# Patient Record
Sex: FEMALE | Employment: OTHER | URBAN - METROPOLITAN AREA
[De-identification: names, ages, dates, MRNs, and addresses within clinical notes are randomized per-mention and may not be internally consistent; named-entity substitution may affect disease eponyms.]

---

## 2024-01-13 NOTE — PROGRESS NOTES
[facility]  [facility]  [facility] [place] [facility]  [address] [place] [address]     [facility]  New Visit Note      Subjective   Patient ID: Chirag Beltran is a 78 y.o. female with past medical history significant for Insomnia. Hypertension, and Sleep disorder breathing.    Patient is here {pxarrival:32131} and referred by [unfilled] for comprehensive sleep medicine evaluation review sleep study. No data recorded is, and   is  today.    Aug  8 2023 : This was at least 20 years ago. She still uses CPAP and tolerates it well. She uses a FFM. She denies issues with pressure. She doesn't use humidity. She is unsure of weight but believes her weight is down significantly from last visit and from when she had sleep study.     She has no issues falling asleep but can't stay asleep. She formerly worked in evenings and doesn't always wind down until later in the evening. She estimates she is currently getting 4 hours of sleep.     Previously she was on GBP and liquid doxepin for waking insomnia and this worked well. She also tried melatonin and hydroxizine which worked for years but stopped working. She also states trazodone worked for a while. She tolerated all of these medications well. She is not currently taking anything for sleep. (Jamison Kern PA-C)    HPI  {OSAhx:06944}      SLEEP STUDY HISTORY: (personally reviewed raw data such as interpretation report, data sheet, hypnogram, and titration table if available and applicable)  CHIRAG has had a sleep study in the past. The record is not available in clinic today.     SLEEP-WAKE SCHEDULE  Weekdays/Work/School Days: usual bed time: 10 p,~usual wake time: 3:30 a~and~falls asleep at about: 10:15 p.   Weekends/Off Work/Vacation Days: same as above schedule.   Naps: Patient Does Not Nap.   SLEEP INITIATION: No problems going to sleep.   SLEEP MAINTENANCE: prolonged  "awakenings~and~wakes up about one time a night.   BREATHING DURING SLEEP: No gasping/choking for air.   Sleep Positioning: supine~and~side.   MORNING SYMPTOMS: No morning symptoms, patient denies morning dry mouth, sore throat or headaches.   DAYTIME: Daytime sleepiness. ~Fatigue. ~Trouble remembering things in daytime. ~Trouble staying focused in daytime. ~Irritable during the day. ~No drowsy driving.   HYPERSOMNIA: No sleep paralysis.~No sleep related hallucinations.   LEGS AT NIGHT: No symptoms of restless legs syndrome (RLS).   MOVEMENTS IN SLEEP: No problematic movements in sleep.   PARASOMNIA: No symptoms of problem parasomnias.     SLEEP ENVIRONMENT  Sleep location: ***  Sleep status: {sleep status:86428}  Room is dark:  {Yes/No:11141::\"Yes\"}  Room is quiet: {Yes/No:64632::\"Yes\"}  Room is cool: {Yes/No:36902::\"Yes\"}  Bed comfort: good    SLEEP HABITS:   Activities before bedtime: ***  Activities in bed: ***  Preferred sleep position: {Sleep position:98908}    SLEEP ROS:  Night symptoms: {sleep apnea ROS at night:87856}  Morning symptoms: {sleep apnea ROS in mornin}  Daytime symptoms {sleep apnea ROS at daytime:84052}  Hypersomnia / narcolepsy symptoms: {narcolepsy ROS:17431}  RLS symptoms: {RLS symptoms:68216}  Movements in sleep: {sleep movements:95841}  Parasomnia symptoms: {parasomnia ROS:91970}    WEIGHT: {weight:28073}    REVIEW OF SYSTEMS: All other systems have been reviewed and are negative.    PERTINENT SOCIAL HISTORY:  Occupation: employment status:53506}  Smoking: {Yes/No:83155}  ETOH: {Yes/No:20036}  Marijuana: {Yes/No:77728}  Caffeine: ***  Sleep aids: {Yes/No:82901}  Claustrophobia: {Yes/No:88361}    PERTINENT PAST SURGICAL HISTORY:  {surgical history pertinent in sleep:59091}    PERTINENT FAMILY HISTORY:  {family history in sleep:30988}    Active Problems, Allergy List, Medication List, and PMH/PSH/FH/Social Hx have been reviewed and reconciled in chart. No significant changes unless " documented in the pertinent chart section. Updates made when necessary.       Objective     Physical Exam  Constitutional:alert and oriented to time, place, and person  Sinus: *** tenderness to palpation  Palate: Normal / Narrow / High-arched / *** torus palatini  Mallampati ***, *** Tongue scalloping, *** macroglossia  Lungs: Clear to auscultation bilateral, no rales  Heart: Regular rate and rhythm, no murmurs    Assessment/Plan   Chirag Betlran is a 78 y.o. female who is seen to evaluate for  obstructive sleep apnea. The pathophysiology of sleep apnea, diagnostic testing (HST vs PSG), treatment options (PAP, oral appliance, surgery, hypoglossal nerve stimulator called Inspire), and supportive management (weight loss, positional therapy, smoking cessation, avoidance of alcohol and sedatives) were discussed with the patient in detail. Risk factors of sleep apnea as well as cardiometabolic and neurocognitive sequelae associated with untreated sleep apnea were also discussed. Lastly, patient was advised to avoid driving vehicle or operating heavy machinery when sleepy.      #CHRONIC SLEEP ONSET AND SLEEP MAINTENANCE INSOMNIA  -Start liquid doxepin as needed for middle of the night waking  -Get out of bed if unable to fall back to sleep in 15-20 minutes  -She doesn't nap, no caffeine later in day  -Possibly secondary to insufficiently treated JJ  -Will re-evaluate after sleep study     #OBSTRUCTIVE SLEEP APNEA  -Start/ Continue [] cmH20 []PAP through [employer].  -May benefit from non-full-face mask  -Goal is stopping waking at night, less daytime sleepiness, better BP  -Sleep apnea and PAP therapy education were provided at length in the clinic today. Chirag Beltran verbalized understanding.  -Emphasized diet, exercise, and weight loss in the clinic, as were non-supine sleep, avoiding alcohol in the late evening, and driving or operating heavy machinery when sleepy.  Chirag  Gaston-Nadegeverbalizes understanding of the above instructions and risks.     #HYPERTENSION  -No recent BP measurements     Followup 2-3 weeks after sleep test to review results     RTC      All of patient's questions were answered. She verbalizes understanding and agreement with my assessment and plan.

## 2024-01-17 ENCOUNTER — APPOINTMENT (OUTPATIENT)
Dept: SLEEP MEDICINE | Facility: CLINIC | Age: 79
End: 2024-01-17
Payer: MEDICARE

## 2024-01-23 ENCOUNTER — APPOINTMENT (OUTPATIENT)
Dept: PRIMARY CARE | Facility: CLINIC | Age: 79
End: 2024-01-23
Payer: MEDICARE

## 2024-02-22 NOTE — PROGRESS NOTES
[facility]  [facility]  [address]  [facility] [id number] [phone]     [facility]  New Visit Note      Subjective   Patient ID: Chirag Beltran is a 78 y.o. female with past medical history significant for Hypertension, HLD, diabetes,  insomnia, and Obstructive sleep apnea.    2/26/24: The patient is here alone today and was referred by herself for comprehensive sleep medicine evaluation due to waking up early difficulty staying asleep, and sleep apnea. She mentioned she did complete the repeated sleep study in 2023, and the sleep lab never called her to make an appointment. She stated that she only took 10 mg of Doxepin to help her sleep. Her ESS is 6, and her LUCINDA is 22  today. She mentioned that she is stressed out because her son had a seizure recently, and she is unable to rest well. She has been using the CPAP for ten years but is unable to recall the setting and brand. She had two times episodes of asthma attacks previously, which made her very distressed. Today, she came to the office to establish care to get a new machine and appropriate treatment.      8/8/23: Ms. CHIRAG BELTRAN presents for sleep medicine consultation referred by self due to early AM waking and JJ. CHIRAG has had a sleep study in the past. The record is not available in clinic today. This was at least 20 years ago. She still uses CPAP and tolerates it well. She uses a FFM. She denies issues with pressure. She doesn't use humidity. She is unsure of weight but believes her weight is down significantly from last visit and from when she had sleep study. She has no issues falling asleep but can't stay asleep. She formerly worked in evenings and doesn't always wind down until later in the evening. She estimates she is currently getting 4 hours of sleep. Previously she was on GBP and liquid doxepin for waking  insomnia and this worked well. She also tried melatonin and hydroxizine which worked for years but stopped working. She also states trazodone worked for a while. She tolerated all of these medications well. She is not currently taking anything for sleep. ( By Jamison Kern P.A.-C)     HPI  Patient had been having these symptoms for the past 30 years.   Patient had sleep study done in the past but no available records.       SLEEP STUDY HISTORY: (personally reviewed raw data such as interpretation report, data sheet, hypnogram, and titration table if available and applicable)  7/9/2013: [facility]: PSG: AHI :5.9/hr, [place]: 88%, ETCO2: 45 mmhg  9/4/2013: [facility]: Titration: suggest 7 CWP, if the not respond should be increased to 9 CWP.    SLEEP-WAKE SCHEDULE  Weekdays/Work/School Days: usual bed time: 1 AM,~usual wake time: 6 AM  Weekends/Off Work/Vacation Days: same as above schedule.   Naps: sometimes. Not refresh  SLEEP INITIATION: 30-60 minutes   SLEEP MAINTENANCE: wakes up about one time a night.   BREATHING DURING SLEEP: No gasping/choking for air.   Sleep Positioning: supine~and~side.   Average sleep duration (excluding naps): 4-5 hours    SLEEP ENVIRONMENT  Sleep location: bed  Sleep status: sleeps alone  Room is dark:  Yes  Room is quiet: Yes  Room is cool: Yes  Bed comfort: good    SLEEP HABITS:   Activities before bedtime: play game  Activities in bed: play game  Preferred sleep position: side    SLEEP ROS:  NIGHT SYMPTOM: Hard to fall asleep, snoring.  MORNING SYMPTOMS: The patient has morning dry mouth  DAYTIME: Daytime sleepiness. ~Fatigue. ~Trouble remembering things in daytime. ~Trouble staying focused in daytime. ~Irritable during the day. ~No drowsy driving.   HYPERSOMNIA: No sleep paralysis.~No sleep related hallucinations.   LEGS AT NIGHT: No symptoms of restless legs syndrome (RLS).   MOVEMENTS IN SLEEP: No problematic movements in sleep.   PARASOMNIA: No symptoms of problem  parasomnias.     WEIGHT: stable    REVIEW OF SYSTEMS: All other systems have been reviewed and are negative.    PERTINENT SOCIAL HISTORY:  Occupation: retired RN   Smoking: No   ETOH: No   Marijuana: No   Caffeine: No  Sleep aids: Yes   Claustrophobia: No     PERTINENT PAST SURGICAL HISTORY:  tonsillectomy    PERTINENT FAMILY HISTORY:  Patient denies family history of any sleep disorder.    Active Problems, Allergy List, Medication List, and PMH/PSH/FH/Social Hx have been reviewed and reconciled in chart. No significant changes unless documented in the pertinent chart section. Updates made when necessary.     Objective     Vitals:    02/26/24 1017   BP: 116/72   Pulse: 90   Temp: 36.9 °C (98.5 °F)   SpO2: 96%      Physical Exam  Constitutional:alert and oriented to time, place, and person  Sinus: - tenderness to palpation  Palate: Narrow Mallampati 2, - Tongue scalloping, - macroglossia  Lungs: Clear to auscultation bilateral, no rales  Heart: Regular rate and rhythm, no murmurs    PAP Adherence:  DURABLE [employer]QU[employer]MENT COMPANY: unknown  Machine: THERAPY: unknown    Mask: MASK TYPE: ESON nasal, NO SUPPLIES SINCE 2019          Issues with therapy: ISSUES WITH THERAPY: unknown  Benefits with PAP: unknown    Assessment/Plan   Mareksatinder Ruby is a 78 y.o. female who is seen to evaluate for mild obstructive sleep apnea. The pathophysiology of sleep apnea, diagnostic testing (HST vs PSG), treatment options (PAP, oral appliance, surgery, hypoglossal nerve stimulator called Inspire), and supportive management (weight loss, positional therapy, smoking cessation, avoidance of alcohol and sedatives) were discussed with the patient in detail. Risk factors of sleep apnea as well as cardiometabolic and neurocognitive sequelae associated with untreated sleep apnea were also discussed. Lastly, patient was advised to avoid driving vehicle or operating heavy machinery when sleepy.     Chirag Beltran with the  following problems:     # OBSTRUCTIVE SLEEP APNEA  -Will order in-lab test to re-evaluate JJ and optimal CPAP settings.  -May benefit from non-full-face mask, not sure the current setting.  -Emphasized diet, exercise, and weight loss in the clinic, as were non-supine sleep, avoiding alcohol in the late evening, and driving or operating heavy machinery when sleepy.  -Chirag Feldman-McCombverbalizes understanding of the above instructions and risks.    #CHRONIC SLEEP ONSET AND SLEEP MAINTENANCE INSOMNIA  -Continue liquid doxepin as needed for middle of the night waking  -Get out of bed if unable to fall back to sleep in 15-20 minutes  -She doesn't nap, no caffeine later in day  -Possibly secondary to insufficiently treated JJ  -Will re-evaluate after sleep study     # DEPRESSION and ANXIETY:  -Refer to see a psychiatric  -Denies HI/SI    #HYPERTENSION  -BP was controlled in the clinic.  -F/U with PCP.  -Denies headache/palpitation and syncope in the clinic.       RTC 2-3 weeks after sleep study      All of patient's questions were answered. She verbalizes understanding and agreement with my assessment and plan.

## 2024-02-22 NOTE — PATIENT INSTRUCTIONS
[facility]  [facility]  [address]  [facility] [address] [phone]       Thank you for coming to the Sleep Medicine Clinic today! Your sleep medicine provider today was: JAMIE Ley Below is a summary of your treatment plan, patient education, other important information, and our contact numbers.    Dear Chirag Beltran,    Thank you for visiting  Sleep Medicine Clinic !     1. We will proceed with a SPLIT NIGHT sleep study. The lab will call you and schedule it for you.     2. Please do not drive when you are sleepy and  start practicing the sleep hygiene  as discussed in clinic today.     3. I am referring you to see a psychiatrist, please make an appointment to follow-up.    4.  FOR QUESTIONS AND CONCERNS:   a) : To schedule, cancel, or reschedule SLEEP STUDY appointments, please call [phone]  b): Please call my office with issues or questions: [phone] ([place]); [phone] ([facility])    If you have a CPAP or BiPAP machine at home, please bring the unit and all accessories including the power cord to your appointments unless I tell you otherwise. Please have knowledge of the DME company you worked with to receive your PAP device. If you have copies of any previous sleep testing completed outside of , please bring with you to clinic as well. This information will make our visits more productive.     If you are new to CPAP or BiPAP, please note the minimum usage insurance requires to continuing coverage for the equipment as noted by your DME company. Please discuss equipment issues (PAP unit, mask fit, humidification, etc.) with your DME company first.       In the event that you are running more than 10 minutes late to your appointment, I will kindly ask you to reschedule. Thanks.      TREATMENT PLAN       Referrals:  We are referring you the the following:  Psychiatry    Follow-up  Appointment:   Follow-up in 2-3 weeks after sleep study.    PATIENT EDUCATION     Obstructive Sleep Apnea (JJ) is a sleep disorder where your upper airway muscles relax during sleep and the airway intermittently and repetitively narrows and collapses leading to partially blocked airway (hypopnea) or completely blocked airway (apnea) which, in turn, can disrupt breathing in sleep, lower oxygen levels while you sleep and cause night time wakings. Because both apnea and hypopnea may cause higher carbon dioxide or low oxygen levels, untreated JJ can lead to heart arrhythmia, elevation of blood pressure, and make it harder for the body to consolidate memory and facilitate metabolism (leading to higher blood sugars at night). Frequent partial arousals occur during sleep resulting in sleep deprivation and daytime sleepiness. JJ is associated with an increased risk of cardiovascular disease, stroke, hypertension, and insulin resistance. Moreover, untreated JJ with excessive daytime sleepiness can increase the risk of motor vehicular accidents.    Some conservative strategies for JJ regardless of JJ severity are:   Positional therapy - Avoid sleeping on your back.   Healthy diet and regular exercise to optimize weight is highly encouraged.   Avoid alcohol late in the evening and sedative-hypnotics as these substances can make sleep apnea worse.   Improve breathing through the nose with intranasal steroid spray, saline rinse, or antihistamines    Safety: Avoid driving vehicle and operating heavy equipment while sleepy. Drowsy driving may lead to life-threatening motor vehicle accidents. A person driving while sleepy is 5 times more likely to have an accident. If you feel sleepy, pull over and take a short power nap (sleep for less than 30 minutes). Otherwise, ask somebody to drive you.    Treatment options for sleep apnea include weight management, positional therapy, Positive Airway Therapy (PAP) therapy, oral  appliance therapy, hypoglossal nerve stimulator (Inspire) and select airway surgeries.    Sleep Testing for sleep apnea: The best and ideal way to check out if you have sleep apnea is to do an overnight sleep study in the sleep laboratory. Alternatively, a home sleep apnea test can also be done depending on your insurance and risk factors.     If you are having a home sleep apnea test, kindly allot 1 hour during pickup of the testing kit as you will have to complete paperwork and listen to the sleep technician for in-person on-the-spot demonstration and instructions on how to hook up the testing kit at home. Do the test for 1 day and start off with sleeping on your back. If you sleep on your side in the middle of night or you have always been a side or stomach-sleeper, it is ok as long as you have some time on your back and off-back.     If you are having an overnight in-lab sleep study, please make sure to bring toiletries, a comfy pillow, additional warm blankets, and any nighttime medications (include as-needed inhaler, pain pill, etc) that you may regularly take. Also, be sure to eat dinner before you arrive as we generally do not provide meals inside the sleep testing center. Lastly, in order to fall asleep faster in the sleep testing center, we advise patients to wake up 2 hours earlier on the morning of scheduled testing and avoid napping 2 days prior testing. Sometimes, your sleep provider may prescribe a sleep aid to be taken at lights out in the sleep testing center. If you are taking a sleep aid, consider having somebody pick you up after the sleep testing.    Overnight sleep studies may be scheduled on a weekday or weekend. We also perform daytime testing for shift workers on a case-by-case basis.    Once you have booked an appointment to do the sleep study, please contact my office for follow-up visit to discuss results.    On the other hand, if you have any of the following, please consider calling the  sleep testing center to RESCHEDULE your sleep study appointment:  If you tested positive for COVID within 10 days of your sleep study appointment.  If you were exposed to somebody who was confirmed for COVID within 10 days of your sleep study appointment and now you are having symptoms of possible COVID  If you have fever>100F or any acute symptoms that you think will lead to poor sleep during testing (e.g. new or worsening stuffy nose not relieved by steroid nasal spray)  If you have traveled domestically or internationally in the last month and now you are having symptoms of possible COVID    Insomnia, or trouble falling asleep or staying asleep, can be caused by many different things such as untreated sleep apnea, anxiety, depression, stress, poor sleep habits, and other medical conditions or medications. The best way to treat insomnia is to treat the cause. In general, we can all benefit from better sleep hygiene (aka good sleep habits). Some recommendations to help you improve your sleep so you can fall asleep, stay asleep, and wake up feeling refreshed include:    Keep a routine bedtime and rise time even on weekends and non-work days. This helps your biological clock stay in sync with your sleep needs. If you currently have variable sleep-wake schedule, start off by waking up and getting out of bed the same time every day, even on weekends or non-work days. Whether you have good or poor sleep, waking up at the same time every day can help re-train and reset your body clock.  Go to bed when you are sleepy and not when tired nor before your goal bedtime. Long periods of time in bed will lead to fragmented, shallow, broken sleep.   Use the bed for sleep and intimacy only. Do not watch TV, eat, read or use phone/laptop in bed. Keeping sleep as the only activity in bed will help re-associate bed equals sleep.  Get up when you cannot sleep in 20-30 minutes. When you are unable to sleep, exit the bed and go to  "another room or chair in bedroom, do something boring, calm, relaxing, distracting, or non-stimulating in dim lighting until you feel sleepy enough to fall back asleep. Avoid stimulating activity or any triggers for mental thinking (e.g. cellphone). Repeat as needed.  Avoid napping during the day to build up sleep pressure so that it won't be hard for you to fall asleep at night. Napping, particularly in the late afternoon or early evening may interfere with your night's sleep. If naps are necessary, limit naps under 15-20 minutes and not later than 3 PM.   Create a \"buffer zone\" or \"wind-down time\". This is a quiet time prior to bedtime, typically at least 30 minutes and perhaps as long as 1-2 hours. During this time, you should do things that are enjoyable, relaxing, and not necessarily goal-oriented like performing relaxation exercises, listening to relaxing music, reading a boring book, dimming the lights, or eating a light bedtime snack like a glass of milk and banana which can promote sleep. Activities that promote relaxation before sleep is important because these can hep quiet the mind and relax the body to get into the right states for sleep.   Do not worry or plan in bed. If you are worrying, planning, feeling anxious, or cannot shut off your thoughts, get up and stay out of bed until you have quieted your mind. Set up a “scheduled worry time” in the morning or late afternoon to write down your worries and stressors as well as plans for the following day.   Keep your bedroom quiet, dark and cool. Ideal sleeping temperature is 65F. If light bothers you, get a slumber mask or blackout shades. If noise bothers you, put ear plugs or get a white noise machine. Alternatively, you may turn on fan or humidifier to create a constant background noise to eliminate unexpected sounds that would otherwise wake you up in the middle of your sleep.  Keep your bedroom technology free. Avoid TV and electronics 1-2 hours " prior to bedtime. Also, turn the clock around to avoid clock-watching. Thoughts of the time can cause frustration and make it more difficult to go to sleep.   DO NOT TRY TOO HARD TO SLEEP. JUST ALLOW SLEEP TO UNFOLD.  Other things to avoid to help   Avoid  caffeine (including chocolate) intake in the late afternoon and early evening. Limit the amount of caffeine and consume before noon.   Avoid smoking 2-3 hours before bedtime. Like caffeine, nicotine in cigarette smoking acts a stimulant.   Avoid alcohol intake 2-3 hours before bedtime. Although alcohol may seem to help you fall asleep more easily as it slows down brain activity, it also disrupts your sleep during the night by causing frequent awakenings as the effect of alcohol wears off. Also, alcohol worsens snoring and sleep apnea  Avoid eating a heavy meal (high-fat, high-carbohydrate, gas-producing foods) at dinner or 2-3 hours before bedtime.    Avoid drinking more than 8 oz liquids in the evening. Restrict fluids after dinner and void at bedtime.  Avoid physical exercise or hot shower / warm bath within 2 hours of bedtime. Regular exercise can improve sleep quality, but exercising or having a warm bath too close to bedtime can disrupt sleep onset. The best time to exercise to help sleep is in the late afternoon or early evening but not within 2 hours of bedtime. Warm baths or hot showers can be taken in the evening but not within 2 hours of going to bed.   Avoid sleeping with pets or kids if they appear to bother your sleep and/or sleep quality.    MOST IMPORTANTLY:  BE PATIENT!  BE CONSISTENT!  Your sleep problem developed over time so it will take some time and consistency to return to a more normal sleep pattern. By following the suggestions listed above, you should see gradual sleep improvements over time.    Also you have been recommended to do Cognitive Behavioral Therapy for Insomnia (CBT-I). CBT-I is widely recognized as an effective treatment for a  wide range of insomnias. CBT-I is typically made up of a number of components including assessment, behavioral and cognitive interventions, motivational techniques, and relapse prevention skills. An overwhelming amount of evidence suggests that CBT-I is as effective as hypnotics and the newer non-benzodiazepine sleep aides in the acute treatment and is more effective than medication in the long term treatment of insomnia.     Below are some resources for CBT-I:  CBT-I at  with Kori Tamassee, NP  GoToSleep- online course via [facility]. Self-guided. One time charge to sign up.  SleepEZ- Free self-guided course from the [facility] https://www.veterantraining.va.gov/insomnia/  Dr. Jean-Baptiste - one on one CBT-I service [email or web address]    You can also go to the following EDUCATION WEBSITES for further information:   American Academy of Sleep Medicine http://sleepeducation.org  National Sleep Foundation: https://sleepfoundation.org  American Sleep Apnea Association: https://www.sleepapnea.org (for patients with sleep apnea)  Narcolepsy Network: https://www.narcolepsynetwork.org (for patients with narcolepsy)  [employer]ole[employer]: https://www.wakeupnarcolepsy.org (for patients with narcolepsy)  Hypersomnia Foundation: https://www.hypersomniafoundation.org (for patients with idiopathic hypersomnia)  RLS foundation: https://www.rls.org (for patients with restless leg syndrome)    IMPORTANT INFORMATION     Call 911 for medical emergencies.  Our offices are generally open from Monday-Friday, 8 am - 5 pm.   There are no supporting services by either the sleep doctors or their staff on weekends and Holidays, or after 5 PM on weekdays.   If you need to get in touch with me, you may either call my office number or you can use iSentium.  If a referral for a test, for CPAP, or for another specialist was made, and you have not heard about scheduling this within a week, please call scheduling at [phone] ([phone]).  If you are unable to make  your appointment for clinic or an overnight study, kindly call the office or sleep testing center at least 48 hours in advance to cancel and reschedule.  If you are on CPAP, please bring your device's card and/or the device to each clinic appointment.   In case of problems with PAP machine or mask interface, please contact your DME (Durable Medical Equipment) company first. DME is the company who provides you the machine and/or PAP supplies.       PRESCRIPTIONS     We require 7 days advanced notice for prescription refills. If we do not receive the request in this time, we cannot guarantee that your medication will be refilled in time.    IMPORTANT PHONE NUMBERS     Sleep Medicine Clinic Fax: [phone]  Appointments (for Pediatric Sleep Clinic): [phone] - option 1  Appointments (for Adult Sleep Clinic): [phone] - option 2  Appointments (For Sleep Studies): [phone] - option 3  Behavioral Sleep Medicine: [phone]  Sleep Surgery: [phone]  Nutrition Service: [phone]  Weight management clinics with endocrinology: [phone]  Bariatric Services: [phone] (includes weight loss medications and weight loss surgery)  [facility]: [phone] (offers holistic approaches to weight management)  ENT (Otolaryngology): [phone]  Headache Clinic (Neurology): [phone]  Neurology: [phone]  Psychiatry: [phone]  Pulmonary Function Testing (PFT) Center: [phone]  Pulmonary Medicine: [phone]  Medical Service Company (DME): [phone]      OUR SLEEP TESTING LOCATIONS     Our team will contact you to schedule your sleep study, however, you can contact us as follow:  Main Phone Line (scheduling only): [phone] ([phone]), option 3  Adult and Pediatric Locations  [facility] (6 years and older): [facility] - 4th floor ([address] [place] [address]) After hours line: [phone]  [facility] at  "[facility] (Main campus: All ages): [facility], 6th floor. After hours line: [phone]   [place] (5 years and older; younger considered on case-by-case basis): [address]; [address], [address]. Scheduling  After hours line: [phone]   [place] (6 years and older): [address]; [address]; [address]   [place] (6 years and older): [address], [address]  After hours line: [phone]   [place] (13 years and older) in [place]: [address], 2nd floor  After hours line: [phone]   [place] (13 year and older): [address]  After hours line: [phone]     Adult Only Locations:   [place] (18 years and older): [address] [facility], 2nd floor   [place] (18 years and older): [address] [facility]; 4th floor  After hours line: [phone]   [place] (18 years and older) at [place]: [address] [facility]  After hours line: [phone]     CONTACTING YOUR SLEEP MEDICINE PROVIDER AND SLEEP TEAM      For issues with your machine or mask interface, please call your DME provider first. DME stands for durable medical company. DME is the company who provides you the machine and/or PAP supplies / accessories.   To schedule, cancel, or reschedule SLEEP STUDY APPOINTMENTS, please call the Main Phone Line at [phone] - option 3.   To schedule, cancel, or reschedule CLINIC APPOINTMENTS, you can do it in \"MyChart\", call [phone] to speak with my  (Bessie Peters), or call the Main Phone Line at [phone] - option 2  For CLINICAL QUESTIONS or MEDICATION REFILLS, please call direct line for Adult Sleep Nurses at [phone].   Lastly, you can also send a message directly to your provider through \"My Chart\", which is the email service through your  Records Account: [email or web address]       Here at [facility], we wish you a restful sleep!   "

## 2024-02-26 ENCOUNTER — OFFICE VISIT (OUTPATIENT)
Dept: SLEEP MEDICINE | Facility: HOSPITAL | Age: 79
End: 2024-02-26
Payer: MEDICARE

## 2024-02-26 VITALS
HEART RATE: 90 BPM | SYSTOLIC BLOOD PRESSURE: 116 MMHG | TEMPERATURE: 98.5 F | OXYGEN SATURATION: 96 % | WEIGHT: 135 LBS | DIASTOLIC BLOOD PRESSURE: 72 MMHG

## 2024-02-26 DIAGNOSIS — F32.A ANXIETY AND DEPRESSION: ICD-10-CM

## 2024-02-26 DIAGNOSIS — G47.00 INSOMNIA, UNSPECIFIED TYPE: ICD-10-CM

## 2024-02-26 DIAGNOSIS — I10 HYPERTENSION, UNSPECIFIED TYPE: ICD-10-CM

## 2024-02-26 DIAGNOSIS — G47.33 OSA (OBSTRUCTIVE SLEEP APNEA): Primary | ICD-10-CM

## 2024-02-26 DIAGNOSIS — F41.9 ANXIETY AND DEPRESSION: ICD-10-CM

## 2024-02-26 PROCEDURE — 3078F DIAST BP <80 MM HG: CPT

## 2024-02-26 PROCEDURE — 99203 OFFICE O/P NEW LOW 30 MIN: CPT

## 2024-02-26 PROCEDURE — 1036F TOBACCO NON-USER: CPT

## 2024-02-26 PROCEDURE — 3074F SYST BP LT 130 MM HG: CPT

## 2024-02-26 PROCEDURE — 1126F AMNT PAIN NOTED NONE PRSNT: CPT

## 2024-02-26 PROCEDURE — 1159F MED LIST DOCD IN RCRD: CPT

## 2024-02-26 PROCEDURE — 99213 OFFICE O/P EST LOW 20 MIN: CPT

## 2024-02-26 RX ORDER — DOXEPIN HYDROCHLORIDE 10 MG/ML
10 SOLUTION ORAL NIGHTLY
COMMUNITY
Start: 2018-08-27 | End: 2024-04-03 | Stop reason: DRUGHIGH

## 2024-02-26 RX ORDER — OMEGA-3-ACID ETHYL ESTERS 1 G/1
200 CAPSULE, LIQUID FILLED ORAL DAILY
COMMUNITY

## 2024-02-26 RX ORDER — ASCORBIC ACID 500 MG
500 TABLET ORAL DAILY
COMMUNITY
End: 2024-06-11 | Stop reason: WASHOUT

## 2024-02-26 RX ORDER — BISMUTH SUBSALICYLATE 262 MG
1 TABLET,CHEWABLE ORAL DAILY
COMMUNITY

## 2024-02-26 RX ORDER — ZINC GLUCONATE 50 MG
TABLET ORAL DAILY
COMMUNITY
End: 2024-06-11 | Stop reason: WASHOUT

## 2024-02-26 RX ORDER — ASPIRIN 81 MG/1
81 TABLET ORAL DAILY
COMMUNITY

## 2024-02-26 RX ORDER — FERROUS SULFATE, DRIED 160(50) MG
1 TABLET, EXTENDED RELEASE ORAL DAILY
COMMUNITY

## 2024-02-26 ASSESSMENT — SLEEP AND FATIGUE QUESTIONNAIRES
HOW LIKELY ARE YOU TO NOD OFF OR FALL ASLEEP WHILE LYING DOWN TO REST IN THE AFTERNOON WHEN CIRCUMSTANCES PERMIT: HIGH CHANCE OF DOZING
HOW LIKELY ARE YOU TO NOD OFF OR FALL ASLEEP WHEN YOU ARE A PASSENGER IN A CAR FOR AN HOUR WITHOUT A BREAK: WOULD NEVER DOZE
HOW LIKELY ARE YOU TO NOD OFF OR FALL ASLEEP WHILE WATCHING TV: HIGH CHANCE OF DOZING
DIFFICULTY_STAYING_ASLEEP: VERY SEVERE
WAKING_TOO_EARLY: VERY SEVERE
ESS-CHAD TOTAL SCORE: 6
SITING INACTIVE IN A PUBLIC PLACE LIKE A CLASS ROOM OR A MOVIE THEATER: WOULD NEVER DOZE
WORRIED_DISTRESSED_DUE_TO_SLEEP: VERY MUCH NOTICEABLE
HOW LIKELY ARE YOU TO NOD OFF OR FALL ASLEEP IN A CAR, WHILE STOPPED FOR A FEW MINUTES IN TRAFFIC: WOULD NEVER DOZE
SLEEP_PROBLEM_NOTICEABLE_TO_OTHERS: SOMEWHAT
HOW LIKELY ARE YOU TO NOD OFF OR FALL ASLEEP WHILE SITTING AND READING: WOULD NEVER DOZE
HOW LIKELY ARE YOU TO NOD OFF OR FALL ASLEEP WHILE SITTING QUIETLY AFTER LUNCH WITHOUT ALCOHOL: WOULD NEVER DOZE
SATISFACTION_WITH_CURRENT_SLEEP_PATTERN: VERY DISSATISFIED
DIFFICULTY_FALLING_ASLEEP: MODERATE
SLEEP_PROBLEM_INTERFERES_DAILY_ACTIVITIES: SOMEWHAT
HOW LIKELY ARE YOU TO NOD OFF OR FALL ASLEEP WHILE SITTING AND TALKING TO SOMEONE: WOULD NEVER DOZE

## 2024-02-26 ASSESSMENT — PAIN SCALES - GENERAL: PAINLEVEL: 0-NO PAIN

## 2024-03-26 ENCOUNTER — TELEPHONE (OUTPATIENT)
Dept: SLEEP MEDICINE | Facility: CLINIC | Age: 79
End: 2024-03-26
Payer: MEDICARE

## 2024-03-26 DIAGNOSIS — G47.33 OSA (OBSTRUCTIVE SLEEP APNEA): Primary | ICD-10-CM

## 2024-03-26 NOTE — TELEPHONE ENCOUNTER
Ps she wants an in home sleep study, pls change the order.      The patient called and wanted to change her study to Home Sleep Study.  The new Home Sleep Study order was placed.

## 2024-03-27 ENCOUNTER — APPOINTMENT (OUTPATIENT)
Dept: PRIMARY CARE | Facility: CLINIC | Age: 79
End: 2024-03-27
Payer: MEDICARE

## 2024-04-03 ENCOUNTER — OFFICE VISIT (OUTPATIENT)
Dept: BEHAVIORAL HEALTH | Facility: CLINIC | Age: 79
End: 2024-04-03
Payer: MEDICARE

## 2024-04-03 VITALS
HEART RATE: 79 BPM | SYSTOLIC BLOOD PRESSURE: 131 MMHG | WEIGHT: 138.9 LBS | TEMPERATURE: 97.6 F | HEIGHT: 62 IN | BODY MASS INDEX: 25.56 KG/M2 | DIASTOLIC BLOOD PRESSURE: 79 MMHG

## 2024-04-03 DIAGNOSIS — G47.00 INSOMNIA, UNSPECIFIED TYPE: ICD-10-CM

## 2024-04-03 DIAGNOSIS — F41.9 ANXIETY: ICD-10-CM

## 2024-04-03 PROCEDURE — 3075F SYST BP GE 130 - 139MM HG: CPT | Performed by: PSYCHIATRY & NEUROLOGY

## 2024-04-03 PROCEDURE — 1160F RVW MEDS BY RX/DR IN RCRD: CPT | Performed by: PSYCHIATRY & NEUROLOGY

## 2024-04-03 PROCEDURE — 3078F DIAST BP <80 MM HG: CPT | Performed by: PSYCHIATRY & NEUROLOGY

## 2024-04-03 PROCEDURE — 1159F MED LIST DOCD IN RCRD: CPT | Performed by: PSYCHIATRY & NEUROLOGY

## 2024-04-03 PROCEDURE — 90792 PSYCH DIAG EVAL W/MED SRVCS: CPT | Performed by: PSYCHIATRY & NEUROLOGY

## 2024-04-03 RX ORDER — TRAZODONE HYDROCHLORIDE 50 MG/1
TABLET ORAL
Qty: 60 TABLET | Refills: 1 | Status: SHIPPED | OUTPATIENT
Start: 2024-04-03 | End: 2024-05-02

## 2024-04-03 RX ORDER — ESCITALOPRAM OXALATE 5 MG/1
TABLET ORAL
Qty: 30 TABLET | Refills: 1 | Status: SHIPPED | OUTPATIENT
Start: 2024-04-03 | End: 2024-05-02 | Stop reason: SDUPTHER

## 2024-04-03 ASSESSMENT — ENCOUNTER SYMPTOMS
CONFUSION: 0
HYPERACTIVE: 0
VOMITING: 0
DIARRHEA: 0
DIFFICULTY URINATING: 0
TREMORS: 0
FATIGUE: 1
NAUSEA: 0
DECREASED CONCENTRATION: 0
AGITATION: 0
HALLUCINATIONS: 0
NERVOUS/ANXIOUS: 1
DYSPHORIC MOOD: 0
SHORTNESS OF BREATH: 0
SEIZURES: 0
SLEEP DISTURBANCE: 1

## 2024-04-03 NOTE — PROGRESS NOTES
"Subjective   Patient ID: Chirag Beltran is a 78 y.o. female who presents for  insomnia     Insomnia associated with racing thoughts ( ongoing ), probably after menopause. Feels tired all the time. Saw a sleep doctor, was prescribed Doxepin at that time, probably was taking it with Gabapentin, helped. Unclear why she quit.   Currently, she thinks she has less patience.   Mood is \" usually pretty good but get tired by 3 pm due to insomnia \". Feels somewhat down, \" tired of being tired\". Off note, used to work evening shift.   Daughter described her as \" anxious\". Feels frustrated a lot. Irritable easily. Thinks she worries a lot about her son, not easy to control her worries.   No loss of interest, not feeling worthless. No thoughts of suicide or homicide.   Has sleep study ordered, not done yet. Had one done many years ago, on C pap for sleep apnea.   On Doxepin 30 mg at bedtime, seems to be helpful.   Helps with initial insomnia but not with intermediate insomnia.         Past Psychiatric History:  Previously Zoloft following her daughter`s death. Caused upset stomach.   Previously Trazodone helped, then stopped working.   Melatonin helped initial insomnia, not intermediate  No past  inpatient admission, no past history of suicide or self harm   No history of omkar or psychosis     Substance Abuse History:  None  Family History:  Son has schizophrenia and alcohol abuse.   Daughter is prescribed Celexa.   Half sister treated for depression and has alcoholism   Social History:  Social History     Socioeconomic History    Marital status:      Spouse name: Not on file    Number of children: Not on file    Years of education: Not on file    Highest education level: Not on file   Occupational History    Not on file   Tobacco Use    Smoking status: Never    Smokeless tobacco: Never   Vaping Use    Vaping Use: Never used   Substance and Sexual Activity    Alcohol use: Never    Drug use: Never    Sexual " activity: Not on file   Other Topics Concern    Not on file   Social History Narrative    Not on file     Social Determinants of Health     Financial Resource Strain: Not on file   Food Insecurity: Not on file   Transportation Needs: Not on file   Physical Activity: Not on file   Stress: Not on file   Social Connections: Not on file   Intimate Partner Violence: Not on file   Housing Stability: Not on file      Born in [place], raised by mother. 3 half brothers and one half sister.   No history of childhood trauma. Associate degree in nursing, worked as a nurse, retired now.  once, has 3 children,  now.   Has an adult son with alcohol use, seizure and schizophrenia. Her son`s legal guardian.   8 year old daughter  at 8 of car accident       Current Outpatient Medications on File Prior to Visit   Medication Sig Dispense Refill    ascorbic acid (Vitamin C) 500 mg tablet Take 1 tablet (500 mg) by mouth once daily.      aspirin 81 mg EC tablet Take 1 tablet (81 mg) by mouth once daily.      calcium carbonate-vitamin D3 500 mg-5 mcg (200 unit) tablet Take 1 tablet by mouth once daily.      doxepin (SINEquan) 10 mg/mL solution Take 1 mL (10 mg) by mouth once daily at bedtime.      multivitamin tablet Take 1 tablet by mouth once daily.      omega-3 acid ethyl esters (Lovaza) 1 gram capsule Take 200 mg by mouth once daily.      zinc gluconate 50 mg tablet Take by mouth once daily.       No current facility-administered medications on file prior to visit.      Patient Active Problem List   Diagnosis    JJ (obstructive sleep apnea)    Insomnia    Hypertension    Anxiety and depression    Anxiety      Review of Systems   Constitutional:  Positive for fatigue.   HENT:  Negative for hearing loss.    Respiratory:  Negative for shortness of breath.    Cardiovascular:  Negative for chest pain.   Gastrointestinal:  Negative for diarrhea, nausea and vomiting.   Genitourinary:  Negative for difficulty urinating.    Musculoskeletal:  Negative for gait problem.   Neurological:  Negative for tremors and seizures.   Psychiatric/Behavioral:  Positive for sleep disturbance. Negative for agitation, behavioral problems, confusion, decreased concentration, dysphoric mood, hallucinations, self-injury and suicidal ideas. The patient is nervous/anxious. The patient is not hyperactive.        Objective   Vitals:    04/03/24 0938   BP: 131/79   Pulse: 79   Temp: 36.4 °C (97.6 °F)      Physical Exam  Psychiatric:         Attention and Perception: Attention normal.         Mood and Affect: Mood is anxious.         Speech: Speech normal.         Behavior: Behavior normal. Behavior is cooperative.         Thought Content: Thought content normal.         Cognition and Memory: Cognition normal.         Judgment: Judgment normal.         Lab Review:   No visits with results within 6 Month(s) from this visit.   Latest known visit with results is:   Legacy Encounter on 09/04/2019   Component Date Value    TSH 09/04/2019 1.98     Glucose 09/04/2019 114 (H)     Sodium 09/04/2019 138     Potassium 09/04/2019 4.2     Chloride 09/04/2019 103     Bicarbonate 09/04/2019 26     Anion Gap 09/04/2019 13     Urea Nitrogen 09/04/2019 23     Creatinine 09/04/2019 0.84     GLOMERULAR FILTRATION RA* 09/04/2019 >60     GLOMERULAR FILTRATION RA* 09/04/2019 >60     Calcium 09/04/2019 10.1     Albumin 09/04/2019 4.3     Alkaline Phosphatase 09/04/2019 59     Total Protein 09/04/2019 6.5     AST 09/04/2019 19     Total Bilirubin 09/04/2019 0.5     ALT (SGPT) 09/04/2019 22     WBC 09/04/2019 12.1 (H)     nRBC 09/04/2019 0.0     RBC 09/04/2019 4.93     Hemoglobin 09/04/2019 13.6     Hematocrit 09/04/2019 42.1     MCV 09/04/2019 85     MCHC 09/04/2019 32.3     Platelets 09/04/2019 312     RDW 09/04/2019 14.0     Neutrophils % 09/04/2019 58.8     Immature Granulocytes %,* 09/04/2019 0.4     Lymphocytes % 09/04/2019 31.5     Monocytes % 09/04/2019 8.0     Eosinophils %  09/04/2019 1.0     Basophils % 09/04/2019 0.3     Neutrophils Absolute 09/04/2019 7.09 (H)     Lymphocytes Absolute 09/04/2019 3.80 (H)     Monocytes Absolute 09/04/2019 0.96 (H)     Eosinophils Absolute 09/04/2019 0.12     Basophils Absolute 09/04/2019 0.04     Cholesterol 09/04/2019 268 (H)     HDL 09/04/2019 50.0     Cholesterol/HDL Ratio 09/04/2019 5.4 (A)     LDL 09/04/2019 170 (H)     VLDL 09/04/2019 48 (H)     Triglycerides 09/04/2019 238 (H)     Non HDL Cholesterol 09/04/2019 218     Vitamin D, 25-Hydroxy 09/04/2019 52      Lab Results   Component Value Date     09/04/2019     03/04/2019    K 4.2 09/04/2019    K 4.2 03/04/2019    CO2 26 09/04/2019    CO2 27 03/04/2019    BUN 23 09/04/2019    BUN 20 03/04/2019    CREATININE 0.84 09/04/2019    CREATININE 0.96 03/04/2019    GLUCOSE 114 (H) 09/04/2019    GLUCOSE 121 (H) 03/04/2019    CALCIUM 10.1 09/04/2019    CALCIUM 9.5 03/04/2019     Lab Results   Component Value Date    WBC 12.1 (H) 09/04/2019    HGB 13.6 09/04/2019    HCT 42.1 09/04/2019    MCV 85 09/04/2019     09/04/2019     Lab Results   Component Value Date    CHOL 268 (H) 09/04/2019    TRIG 238 (H) 09/04/2019    HDL 50.0 09/04/2019       Assessment/Plan     1-Insomnia   2- Anxiety       1-Discussed sleep hygiene   2-Follow up with sleep medicine   3-Therapy   4-A trial of Lexapro to address her anxiety,   5-Discontinue Doxepin and try Trazodone   6-Follow up in 1-2 months or sooner if needed   7-Do not start them until she does the sleep study which is today         Kim Plummer MD

## 2024-04-10 ENCOUNTER — CLINICAL SUPPORT (OUTPATIENT)
Dept: SLEEP MEDICINE | Facility: HOSPITAL | Age: 79
End: 2024-04-10
Payer: MEDICARE

## 2024-04-10 DIAGNOSIS — G47.33 OSA (OBSTRUCTIVE SLEEP APNEA): ICD-10-CM

## 2024-04-10 PROCEDURE — 95806 SLEEP STUDY UNATT&RESP EFFT: CPT | Performed by: ALLERGY & IMMUNOLOGY

## 2024-04-17 ENCOUNTER — APPOINTMENT (OUTPATIENT)
Dept: SLEEP MEDICINE | Facility: CLINIC | Age: 79
End: 2024-04-17
Payer: MEDICARE

## 2024-04-29 ENCOUNTER — OFFICE VISIT (OUTPATIENT)
Dept: PRIMARY CARE | Facility: CLINIC | Age: 79
End: 2024-04-29
Payer: MEDICARE

## 2024-04-29 VITALS
DIASTOLIC BLOOD PRESSURE: 67 MMHG | BODY MASS INDEX: 25.58 KG/M2 | WEIGHT: 139 LBS | SYSTOLIC BLOOD PRESSURE: 117 MMHG | RESPIRATION RATE: 16 BRPM | HEART RATE: 79 BPM | TEMPERATURE: 98 F | OXYGEN SATURATION: 96 % | HEIGHT: 62 IN

## 2024-04-29 DIAGNOSIS — M54.50 CHRONIC MIDLINE LOW BACK PAIN WITHOUT SCIATICA: ICD-10-CM

## 2024-04-29 DIAGNOSIS — G47.33 OSA (OBSTRUCTIVE SLEEP APNEA): ICD-10-CM

## 2024-04-29 DIAGNOSIS — F32.A ANXIETY AND DEPRESSION: ICD-10-CM

## 2024-04-29 DIAGNOSIS — E03.9 HYPOTHYROIDISM, UNSPECIFIED TYPE: ICD-10-CM

## 2024-04-29 DIAGNOSIS — Z00.00 HEALTH CARE MAINTENANCE: ICD-10-CM

## 2024-04-29 DIAGNOSIS — I10 PRIMARY HYPERTENSION: ICD-10-CM

## 2024-04-29 DIAGNOSIS — M85.80 OSTEOPENIA WITH HIGH RISK OF FRACTURE: ICD-10-CM

## 2024-04-29 DIAGNOSIS — M54.12 CERVICAL RADICULOPATHY: ICD-10-CM

## 2024-04-29 DIAGNOSIS — E55.9 VITAMIN D DEFICIENCY: ICD-10-CM

## 2024-04-29 DIAGNOSIS — G89.29 CHRONIC MIDLINE LOW BACK PAIN WITHOUT SCIATICA: ICD-10-CM

## 2024-04-29 DIAGNOSIS — Z78.0 MENOPAUSE: ICD-10-CM

## 2024-04-29 DIAGNOSIS — Z12.31 ENCOUNTER FOR SCREENING MAMMOGRAM FOR MALIGNANT NEOPLASM OF BREAST: ICD-10-CM

## 2024-04-29 DIAGNOSIS — F41.9 ANXIETY AND DEPRESSION: ICD-10-CM

## 2024-04-29 DIAGNOSIS — E11.9 TYPE 2 DIABETES MELLITUS WITHOUT COMPLICATION, WITHOUT LONG-TERM CURRENT USE OF INSULIN (MULTI): Primary | ICD-10-CM

## 2024-04-29 PROBLEM — R32 URINARY INCONTINENCE: Status: ACTIVE | Noted: 2024-04-29

## 2024-04-29 PROCEDURE — 3074F SYST BP LT 130 MM HG: CPT | Performed by: STUDENT IN AN ORGANIZED HEALTH CARE EDUCATION/TRAINING PROGRAM

## 2024-04-29 PROCEDURE — 3078F DIAST BP <80 MM HG: CPT | Performed by: STUDENT IN AN ORGANIZED HEALTH CARE EDUCATION/TRAINING PROGRAM

## 2024-04-29 PROCEDURE — 1159F MED LIST DOCD IN RCRD: CPT | Performed by: STUDENT IN AN ORGANIZED HEALTH CARE EDUCATION/TRAINING PROGRAM

## 2024-04-29 PROCEDURE — 1160F RVW MEDS BY RX/DR IN RCRD: CPT | Performed by: STUDENT IN AN ORGANIZED HEALTH CARE EDUCATION/TRAINING PROGRAM

## 2024-04-29 PROCEDURE — 1123F ACP DISCUSS/DSCN MKR DOCD: CPT | Performed by: STUDENT IN AN ORGANIZED HEALTH CARE EDUCATION/TRAINING PROGRAM

## 2024-04-29 PROCEDURE — 99204 OFFICE O/P NEW MOD 45 MIN: CPT | Performed by: STUDENT IN AN ORGANIZED HEALTH CARE EDUCATION/TRAINING PROGRAM

## 2024-04-29 RX ORDER — ALENDRONATE SODIUM 70 MG/1
TABLET ORAL
COMMUNITY
Start: 2019-03-04 | End: 2024-06-11 | Stop reason: WASHOUT

## 2024-04-29 RX ORDER — LEVOTHYROXINE SODIUM 50 UG/1
1 TABLET ORAL DAILY
COMMUNITY
Start: 2019-03-04

## 2024-04-29 RX ORDER — ATORVASTATIN CALCIUM 20 MG/1
20 TABLET, FILM COATED ORAL
COMMUNITY
Start: 2023-02-16 | End: 2024-06-11 | Stop reason: WASHOUT

## 2024-04-29 RX ORDER — OXYBUTYNIN CHLORIDE 5 MG/1
1 TABLET, EXTENDED RELEASE ORAL DAILY
COMMUNITY
Start: 2019-09-04 | End: 2024-06-11 | Stop reason: WASHOUT

## 2024-04-29 ASSESSMENT — ENCOUNTER SYMPTOMS
DEPRESSION: 0
LOSS OF SENSATION IN FEET: 0
OCCASIONAL FEELINGS OF UNSTEADINESS: 0

## 2024-04-29 NOTE — PROGRESS NOTES
"Subjective   Patient ID: Chirag Beltran is a 78 y.o. female who presents for Establish Care.  HPI  New patient   Was at [facility]     Per chart review patient was prescribed the following for her current medical conditions   hyperlipidemia-atorvastatin 20, aspirin 81 mg  Hypertension-  Anxiety and depression-Lexapro 5 mg  Insomnia-trazodone 100 mg , doxepin  Obstructive sleep apnea-following up with sleep medicine  Osteoporosis-on alendronate, calcium and vitamin D supplements  Diabetes mellitus-on metformin  Hypothyroidism-on levothyroxine 50 mcg  Urinary incontinence on oxybuty    Other supplements  Multivitamin  Zinc gluconate  Fish oil       Patient reports Not taking any meds above except for doxepin, multivitamin and fish oil    Cancer screening     Mammogram-2022-negative- prev h/o abnormal MMG; had biopsy/--- MMG  DEXA scan-osteopenia with FRAX 40.2  Colonoscopy -diverticulosis, repeat in 10 years       Immunizations  Tdap-2020  Shingles- reports to have had shingles before covid pandemic   PPSV23 2020, Prevnar 2023              Past Surgical History:   Procedure Laterality Date    OTHER SURGICAL HISTORY  2019    Appendectomy    OTHER SURGICAL HISTORY  2019     section    OTHER SURGICAL HISTORY  2019    Tonsillectomy    Hospitalizations: none       Allergies   Allergen Reactions    Sertraline GI Upset and Other     2001;DIARRHEA, 2001;DIARRHEA    Other reaction(s): GI Upset   2001;DIARRHEA, 2001;DIARRHEA    Sulfa (Sulfonamide Antibiotics) Hives     Hives, Hives   Patient denies allergies / side effects to oxybutyinin           Review of Systems    Objective   Visit Vitals  /67   Pulse 79   Temp 36.7 °C (98 °F)   Resp 16   Ht 1.575 m (5' 2\")   Wt 63 kg (139 lb)   SpO2 96%   BMI 25.42 kg/m²   Smoking Status Never   BSA 1.66 m²      Physical Exam    Assessment/Plan   Diagnoses and all orders for this visit:  Type " 2 diabetes mellitus without complication, without long-term current use of insulin (Multi)  -     Hemoglobin A1C; Future  Anxiety and depression  JJ (obstructive sleep apnea)  Primary hypertension  -     Comprehensive Metabolic Panel; Future  -     Lipid Panel; Future  -     CBC; Future  Osteopenia with high risk of fracture  -     Vitamin D 25 hydroxy; Future  Hypothyroidism, unspecified type  -     TSH with reflex to Free T4 if abnormal; Future  Health care maintenance  -     BI mammo bilateral screening tomosynthesis; Future  -     XR DEXA bone density; Future  Vitamin D deficiency  -     Vitamin D 25 hydroxy; Future  Cervical radiculopathy  -     XR cervical spine 2-3 views; Future  -     Referral to Physical Therapy; Future  Chronic midline low back pain without sciatica  -     XR lumbar spine 2-3 views; Future  -     Referral to Physical Therapy; Future  Encounter for screening mammogram for malignant neoplasm of breast  -     BI mammo bilateral screening tomosynthesis; Future  Menopause  -     XR DEXA bone density; Future    [] Patient to find out records of dexa scan and see if fosamax was taken for 5 years   - DEXA and continue ruth ann and Vit D supple    We will call the patient with abnormal labs if any and discuss necessary changes based on labs; otherwise patient to follow up in 3 months for HTN/ HLD  and in 1 year for next physical exam Patient to seek medical attention immediately / call 911  if has worsening of symptoms  or develops alarm symptoms as discussed. Verbalizes understanding

## 2024-05-02 DIAGNOSIS — F41.9 ANXIETY: ICD-10-CM

## 2024-05-02 RX ORDER — ESCITALOPRAM OXALATE 5 MG/1
10 TABLET ORAL DAILY
Qty: 30 TABLET | Refills: 1 | Status: SHIPPED | OUTPATIENT
Start: 2024-05-02 | End: 2024-05-02 | Stop reason: DRUGHIGH

## 2024-05-02 RX ORDER — ESCITALOPRAM OXALATE 10 MG/1
10 TABLET ORAL DAILY
Qty: 30 TABLET | Refills: 1 | Status: SHIPPED | OUTPATIENT
Start: 2024-05-02 | End: 2024-06-11 | Stop reason: WASHOUT

## 2024-05-02 RX ORDER — ESCITALOPRAM OXALATE 5 MG/1
TABLET ORAL
Qty: 90 TABLET | Refills: 1 | OUTPATIENT
Start: 2024-05-02

## 2024-05-14 ENCOUNTER — LAB (OUTPATIENT)
Dept: LAB | Facility: LAB | Age: 79
End: 2024-05-14
Payer: MEDICARE

## 2024-05-14 DIAGNOSIS — E03.9 HYPOTHYROIDISM, UNSPECIFIED TYPE: ICD-10-CM

## 2024-05-14 DIAGNOSIS — M85.80 OSTEOPENIA WITH HIGH RISK OF FRACTURE: ICD-10-CM

## 2024-05-14 DIAGNOSIS — I10 PRIMARY HYPERTENSION: ICD-10-CM

## 2024-05-14 DIAGNOSIS — E55.9 VITAMIN D DEFICIENCY: ICD-10-CM

## 2024-05-14 DIAGNOSIS — E11.9 TYPE 2 DIABETES MELLITUS WITHOUT COMPLICATION, WITHOUT LONG-TERM CURRENT USE OF INSULIN (MULTI): ICD-10-CM

## 2024-05-14 LAB
25(OH)D3 SERPL-MCNC: 32 NG/ML (ref 30–100)
ALBUMIN SERPL BCP-MCNC: 3.9 G/DL (ref 3.4–5)
ALP SERPL-CCNC: 63 U/L (ref 33–136)
ALT SERPL W P-5'-P-CCNC: 14 U/L (ref 7–45)
ANION GAP SERPL CALC-SCNC: 12 MMOL/L (ref 10–20)
AST SERPL W P-5'-P-CCNC: 15 U/L (ref 9–39)
BILIRUB SERPL-MCNC: 0.5 MG/DL (ref 0–1.2)
BUN SERPL-MCNC: 17 MG/DL (ref 6–23)
CALCIUM SERPL-MCNC: 9.3 MG/DL (ref 8.6–10.6)
CHLORIDE SERPL-SCNC: 107 MMOL/L (ref 98–107)
CHOLEST SERPL-MCNC: 221 MG/DL (ref 0–199)
CHOLESTEROL/HDL RATIO: 3.6
CO2 SERPL-SCNC: 26 MMOL/L (ref 21–32)
CREAT SERPL-MCNC: 0.96 MG/DL (ref 0.5–1.05)
EGFRCR SERPLBLD CKD-EPI 2021: 61 ML/MIN/1.73M*2
ERYTHROCYTE [DISTWIDTH] IN BLOOD BY AUTOMATED COUNT: 13.1 % (ref 11.5–14.5)
EST. AVERAGE GLUCOSE BLD GHB EST-MCNC: 140 MG/DL
GLUCOSE SERPL-MCNC: 110 MG/DL (ref 74–99)
HBA1C MFR BLD: 6.5 %
HCT VFR BLD AUTO: 45.1 % (ref 36–46)
HDLC SERPL-MCNC: 61.5 MG/DL
HGB BLD-MCNC: 13.7 G/DL (ref 12–16)
LDLC SERPL CALC-MCNC: 129 MG/DL
MCH RBC QN AUTO: 26.9 PG (ref 26–34)
MCHC RBC AUTO-ENTMCNC: 30.4 G/DL (ref 32–36)
MCV RBC AUTO: 88 FL (ref 80–100)
NON HDL CHOLESTEROL: 160 MG/DL (ref 0–149)
NRBC BLD-RTO: 0 /100 WBCS (ref 0–0)
PLATELET # BLD AUTO: 292 X10*3/UL (ref 150–450)
POTASSIUM SERPL-SCNC: 4.4 MMOL/L (ref 3.5–5.3)
PROT SERPL-MCNC: 6.1 G/DL (ref 6.4–8.2)
RBC # BLD AUTO: 5.1 X10*6/UL (ref 4–5.2)
SODIUM SERPL-SCNC: 141 MMOL/L (ref 136–145)
T4 FREE SERPL-MCNC: 1.01 NG/DL (ref 0.78–1.48)
TRIGL SERPL-MCNC: 152 MG/DL (ref 0–149)
TSH SERPL-ACNC: 7.63 MIU/L (ref 0.44–3.98)
VLDL: 30 MG/DL (ref 0–40)
WBC # BLD AUTO: 9.9 X10*3/UL (ref 4.4–11.3)

## 2024-05-14 PROCEDURE — 80061 LIPID PANEL: CPT

## 2024-05-14 PROCEDURE — 85027 COMPLETE CBC AUTOMATED: CPT

## 2024-05-14 PROCEDURE — 84443 ASSAY THYROID STIM HORMONE: CPT

## 2024-05-14 PROCEDURE — 80053 COMPREHEN METABOLIC PANEL: CPT

## 2024-05-14 PROCEDURE — 82306 VITAMIN D 25 HYDROXY: CPT

## 2024-05-14 PROCEDURE — 83036 HEMOGLOBIN GLYCOSYLATED A1C: CPT

## 2024-05-14 PROCEDURE — 84439 ASSAY OF FREE THYROXINE: CPT

## 2024-05-14 PROCEDURE — 36415 COLL VENOUS BLD VENIPUNCTURE: CPT

## 2024-06-04 ENCOUNTER — APPOINTMENT (OUTPATIENT)
Dept: PHYSICAL THERAPY | Facility: CLINIC | Age: 79
End: 2024-06-04
Payer: MEDICARE

## 2024-06-05 ENCOUNTER — TELEPHONE (OUTPATIENT)
Dept: PRIMARY CARE | Facility: CLINIC | Age: 79
End: 2024-06-05

## 2024-06-05 ENCOUNTER — APPOINTMENT (OUTPATIENT)
Dept: BEHAVIORAL HEALTH | Facility: CLINIC | Age: 79
End: 2024-06-05
Payer: MEDICARE

## 2024-06-11 ENCOUNTER — TELEPHONE (OUTPATIENT)
Dept: SLEEP MEDICINE | Facility: CLINIC | Age: 79
End: 2024-06-11
Payer: MEDICARE

## 2024-06-11 DIAGNOSIS — G47.33 OSA (OBSTRUCTIVE SLEEP APNEA): Primary | ICD-10-CM

## 2024-06-11 NOTE — TELEPHONE ENCOUNTER
The patient called, and I returned her insomnia question about night-awakening insomnia, which could related to her untreated Obstructive sleep apnea. I also reviewed the sleep study report with her and will start the CPAP; I also discussed with the patient to only take Doxepin, which is prescribed by me or the psychiatrist provider's trazodone only, to prevent polypharmacy and decrease the risk of falls. The patient verbalized understanding it.

## 2024-06-11 NOTE — PROGRESS NOTES
Physical Therapy  Physical Therapy Orthopedic Evaluation    Patient Name: Chirag Beltran  MRN: [id number]  Today's Date: 6/12/2024         Insurance:  Insurance Type: A[employer]na Medicare  Visit number: 1  Visit Limit: MN  Authorization info: 6/12/24 to 9/9/24    General:  Reason for visit: Chronic low back pain, cervical radiculopathy  Referred by: oDnovan      Current Problem  No diagnosis found.    Precautions:     STEADI Fall Risk Score (The score of 4 or more indicates an increased risk of falling):     Subjective:   Subjective   Chief Complaint: low back pain, cervical radiculopathy  Onset: ***  JAKE: ***    Current Condition:  {JAMBWS:04175}    Living will: {JAMWILL:20146}  Healthcare POA: {JAMWILL:33605}    PAIN  Intensity (0-10): ***  Location: ***  Description: ***    Aggravating Factors:  ***  Relieving Factors:  rest    Relevant Information (PMH & Previous Tests/Imaging): n/a  Previous Interventions/Treatments: n/a    Prior Level of Function (PLOF)  Exercise/Physical Activity:   Work/School:     Treatment Goals: reduce pain    Red Flags: Do you have any of the following? No  Fever/chills, unexplained weight changes, dizziness/fainting, unexplained change in bowel or bladder functions, unexplained malaise or muscle weakness, night pain/sweats, numbness or tingling    Objective:  Objective   Observation: ***    Gait: ***    4 Stage Balance Test  Close stance: ***  Semi-Tandem: ***  Tandem  R: ***  L: ***  Single Leg  R: ***  L: ***    Lumbar AROM  Flexion: ***  Extension: ***  Rotation  R: ***  L: ***  Lateral Flexion  R: ***  L: ***    Cervical AROM  Flexion: ***  Extension: ***  Rotation  R: ***  L: ***  Lateral Flexion  R: ***  L: ***    Shoulder AROM  Flexion  R: ***  L: ***  Abduction  R: ***  L: ***  Functional IR  R: ***  L: ***  Functional ER  R: ***  L: ***    UE MMT  Shoulder Flexion  R: ***  L: ***  Shoulder Abduction  R: ***  L: ***  Shoulder ER  R: ***  L: ***  Shoulder IR  R: ***  L:  ***  Elbow Flexion  R: ***  L: ***  Elbow Extension  R: ***  L: ***  Wrist Flexion  R: ***  L: ***  Wrist Extension  R: ***  L: ***   Strength R = L    LE MMT   Hip Flexion  R: ***  L: ***  Hip Extension  R: ***  L: ***  Hip Abduction  R: ***  L: ***  Hip Adduction  R:  L:  Hip IR  R: ***  L: ***  Hip ER  R: ***  L: ***  Knee Flexion  R: ***  L: ***  Knee Extension  R: ***  L: ***  Ankle DF  R: ***  L: ***  Ankle PF  R: ***  L: ***    Flexibility  Hamstrings: ***  Quads: ***  Hip Flexors: ***    Special Tests:  Spurling's:  Compression:  ULTT:  Dermatome:    Palpation: ***    Outcome Measures:      EDUCATION: home exercise program, plan of care, activity modifications, pain management, and injury pathology       Goals:      Treatments:   See below    Equipment Provided:   HEP Provided:        Charges: {JAMcomplexeval:58262}, {jamcharges:57846} x {JAMEVALGOALS1:27434}, {jamcharges:26290}      Assessment: Pt is a 77 y/o F with signs and symptoms consistent with the referring diagnosis of chronic low back pain, cervical radiculopathy. Pt displayed lack of lumbar, cervical range of motion, gross lower, upper extremity strength as well as flexibility, pain and functional mobility deficits. Skilled physical therapy is necessary in order to improve aforementioned impairments to allow for increased participation in functional and recreational activities without limitations. Plan of care will consist of cervical, core, lower, upper extremity stretching and strengthening in order to return to PLOF.    Eval Complexity: {jamcomplexity:26437}  Clinical Presentation: {JAMclinicalpresenation:05542}  Prognosis: {JAMprognosis:88601}      Plan: Continue to improve functional mobility, functional strength in order to increase participation in ADLs.    Patient and/or family understands and agrees with goals and plan documented.       Planned Interventions include: therapeutic exercise, self-care home management, manual therapy,  therapeutic activities, neuromuscular coordination  Frequency: 1x/week  Duration: 8 weeks    Oscar Pryor, PT

## 2024-06-12 ENCOUNTER — APPOINTMENT (OUTPATIENT)
Dept: PHYSICAL THERAPY | Facility: CLINIC | Age: 79
End: 2024-06-12
Payer: MEDICARE

## 2024-06-12 ENCOUNTER — EVALUATION (OUTPATIENT)
Dept: PHYSICAL THERAPY | Facility: CLINIC | Age: 79
End: 2024-06-12
Payer: MEDICARE

## 2024-06-12 ENCOUNTER — APPOINTMENT (OUTPATIENT)
Dept: PRIMARY CARE | Facility: CLINIC | Age: 79
End: 2024-06-12
Payer: MEDICARE

## 2024-06-12 DIAGNOSIS — M54.2 NECK PAIN: Primary | ICD-10-CM

## 2024-06-12 DIAGNOSIS — M54.50 CHRONIC MIDLINE LOW BACK PAIN WITHOUT SCIATICA: ICD-10-CM

## 2024-06-12 DIAGNOSIS — G89.29 CHRONIC MIDLINE LOW BACK PAIN WITHOUT SCIATICA: ICD-10-CM

## 2024-06-12 DIAGNOSIS — M54.9 BACK PAIN: ICD-10-CM

## 2024-06-12 DIAGNOSIS — M54.12 CERVICAL RADICULOPATHY: ICD-10-CM

## 2024-06-12 PROCEDURE — 97161 PT EVAL LOW COMPLEX 20 MIN: CPT | Mod: GP

## 2024-06-12 PROCEDURE — 97110 THERAPEUTIC EXERCISES: CPT | Mod: GP

## 2024-06-12 ASSESSMENT — ENCOUNTER SYMPTOMS
DEPRESSION: 0
OCCASIONAL FEELINGS OF UNSTEADINESS: 0
LOSS OF SENSATION IN FEET: 0

## 2024-06-12 NOTE — PROGRESS NOTES
Physical Therapy  Physical Therapy Orthopedic Evaluation    Patient Name: Chirag Beltran  MRN: [id number]  Today's Date: 6/12/2024    Time Calculation  Start Time: 1135  Stop Time: 1210  Time Calculation (min): 35 min    Insurance:  Insurance Type: Aetna Medicare  Visit number: 1  Visit Limit: MN  Authorization info: 6/12/24 to 9/9/24    General:  Reason for visit: cervical radiculopathy, chronic back pain  Referred by: Donovan      Current Problem  1. Neck pain  Follow Up In Physical Therapy      2. Cervical radiculopathy  Referral to Physical Therapy      3. Chronic midline low back pain without sciatica  Referral to Physical Therapy      4. Back pain  Follow Up In Physical Therapy          Precautions: low fall risk     STEADI Fall Risk Score (The score of 4 or more indicates an increased risk of falling): 4    Subjective:   Subjective   Chief Complaint: neck, back pain  Onset: 5/1/24  JAKE: chronic    Pt reports to session with chief complaints of chronic low back pain as well as acute neck pain. Pt reports that back pain has been ongoing for years but has worsened recently. Denies any mechanism of injury that lead to symptom onset or exacerbation. Denies any LLE radiation of symptoms. States that symptoms limit pt's functional ability as she has pain with prolonged standing, walking, stair negotiation. Pt reports new onset of neck pain that started after beginning new job at [employer]. Denies any specific mechanism of injury. Mentions that B hands experience numbness, tingling after prolonged knitting or typing but denies any feeling of radiating symptoms. Pt has not had therapy for these complaints. Pt is awaiting to schedule xrays for both complaints. Pt is hoping to improve pain, function.     Current Condition:  worse    Living will: No  Healthcare POA: No    PAIN  Intensity (0-10): 0/10 current; 8/10 worst  Location: neck, back   Description: sharp, shooting    Aggravating Factors:  bending,  lifting, walking, working  Relieving Factors:  rest, supine    Relevant Information (PMH & Previous Tests/Imaging): awaiting to schedule xrays  Previous Interventions/Treatments: n/a    Prior Level of Function (PLOF)  Exercise/Physical Activity: independent  Work/School: independent    Treatment Goals: reduce pain, improve ability    Red Flags: Do you have any of the following? No  Fever/chills, unexplained weight changes, dizziness/fainting, unexplained change in bowel or bladder functions, unexplained malaise or muscle weakness, night pain/sweats, numbness or tingling    Objective:  Objective   Observation: rounded shoulders    Gait: mildly antalgic gait pattern consisting of decreased stride, step length    4 Stage Balance Test  Close stance: 30 sec   Semi-Tandem: 30 sec B  Tandem  R: 6 sec  L: 22 sec   Single Leg  R: unable  L: unable    Cervical AROM  Flexion: 50%  Extension: 25%, P!  Rotation  R: 50%, P!  L: 50%, P!  Lateral Flexion  R: 50%, P!  L: 50%, P!    Shoulder AROM grossly WFL    UE MMT  Shoulder Flexion  R: 3+  L: 3+  Shoulder Abduction  R: 3+  L: 3+  Shoulder ER  R: 4-  L: 4-  Shoulder IR  R: 4-  L: 4-  Elbow Flexion  R: 4  L: 4  Elbow Extension  R: 4  L: 4  Wrist Flexion  R: 4  L: 4  Wrist Extension  R: 4  L: 4   Strength R = L    UE Dermatomes grossly intact    Lumbar AROM  Flexion: 75%  Extension: 25%  Rotation  R: 50%  L: 50%  Lateral Flexion  R: 50%  L: 50%    LE MMT   Hip Flexion  R: 4-  L: 4-  Hip Extension  R: 4-  L: 4-  Hip Abduction  R: 4-  L: 4-  Hip IR  R: 4-  L: 4-  Hip ER  R: 4-  L: 4-  Knee Flexion  R: 4  L: 4  Knee Extension  R: 4  L: 4  Ankle DF  R: 5  L: 5  Ankle PF  R: 4+  L: 4+    Flexibility  Hamstrings: mod tight B  Quads: mod tight B  Hip Flexors: mod tight B    Special Tests  Spurling's: negative B    Functional Screening  Transfers: independent    Outcome Measures:  ISAMAR: 4  NDI: 9    EDUCATION: home exercise program, plan of care, activity modifications, pain management,  and injury pathology       Goals:  Active       PT Problem       PT Goal 1       Start:  06/12/24    Expected End:  07/12/24       In 4 weeks, pt will rate pain 0-4 on the numeric pain scale to allow for restful nights of sleep.  In 4 weeks, pt will be able to walk grocery store for 15 minutes without an increase in pain.  In 4 weeks, pt will be able to stand to perform hygiene routine safely for 30 minutes without an increase in pain.         PT Goal 2       Start:  06/12/24    Expected End:  08/11/24       In 8 weeks, pt will be able to work 10 hour work weeks without an increase in pain.   In 8 weeks, pt's ISAMAR, NDI will be +6 each.  In 8 weeks, pt will be independent with HEP.             Treatments:   See below    HEP Provided:    Access Code: [email or web address]  URL: [email or web address]  Date: 06/12/2024  Prepared by: Oscar Pryor    Exercises  - Seated Cervical Retraction  - 2 x daily - 7 x weekly - 2 sets - 15 reps  - Seated Scapular Retraction  - 2 x daily - 7 x weekly - 15 reps  - Supine Transversus Abdominis Bracing - Hands on Stomach  - 2 x daily - 7 x weekly - 15 reps  - Supine Bridge  - 2 x daily - 7 x weekly - 15 reps    Charges: low complexity eval x 1, Therapeutic Exercise x 1    Assessment: Pt is a 77 y/o F with signs and symptoms consistent with the referring diagnosis of cervical radiculopathy and lumbar spine osteoarthritis. Pt displayed lack of cervical, lumbar range of motion, gross upper, lower extremity strength as well as flexibility, pain and functional ability deficits. Skilled physical therapy is necessary in order to improve aforementioned impairments to allow for increased participation in functional and recreational activities without limitations. Plan of care will consist of cervical, upper lower extremity stretching and strengthening in order to return to PLOF.    Eval Complexity: Low  Clinical Presentation: Stable  Prognosis: Fair      Plan: Continue to improve  functional mobility, functional strength in order to increase participation in ADLs.    Patient and/or family understands and agrees with goals and plan documented.       Planned Interventions include: therapeutic exercise, self-care home management, manual therapy, therapeutic activities, gait training, neuromuscular coordination, aquatic therapy  Frequency: 1x/week  Duration: 8 weeks    Oscar Pryor PT

## 2024-06-19 ENCOUNTER — TREATMENT (OUTPATIENT)
Dept: PHYSICAL THERAPY | Facility: CLINIC | Age: 79
End: 2024-06-19
Payer: MEDICARE

## 2024-06-19 DIAGNOSIS — M54.2 NECK PAIN: ICD-10-CM

## 2024-06-19 DIAGNOSIS — M54.9 BACK PAIN: ICD-10-CM

## 2024-06-19 PROCEDURE — 97110 THERAPEUTIC EXERCISES: CPT | Mod: GP

## 2024-06-19 NOTE — PROGRESS NOTES
Physical Therapy  Physical Therapy Treatment    Patient Name: Chirag Beltran  MRN: [id number]  Today's Date: 6/19/2024    Time Calculation  Start Time: 1130  Stop Time: 1215  Time Calculation (min): 45 min    Insurance:  Insurance Type: [employer] Medicare  Visit number: 1  Visit Limit: MN  Authorization info: 6/12/24 to 9/9/24    General:  Reason for visit: cervical radiculopathy, chronic back pain  Referred by: Donovan      Current Problem  1. Neck pain  Follow Up In Physical Therapy      2. Back pain  Follow Up In Physical Therapy          Precautions: none    Pain: 3/10    Subjective:   Subjective   Patient reports cramping with glute bridges in HEP, some pain in knees walking in.    HEP Compliance: Good    Objective:   Objective   Independent with transfers    Treatments:     Therapeutic Exercise:  Nu-Step 5 min  Glute bridges w/ RTB - 2 x 15  Glute bridges w/ abduction RTB - 2 x 15  Supine TA 5 sec hold - 1 x 15  Supine physio ball curls - 1 x 15  Supine physio ball bridge - 1 x 15  Supine YTB pull-apart - 2 x 15  Supine YTB ER - 2 x 15  Chin tuck lift and hold 3 sec - 2 x 15  Seated Upper trap stretch - 1' B  Seated levator scap stretch - 1' B        Access Code: [email or web address]  URL: [email or web address]  Date: 06/19/2024  Prepared by: Oscar Pryor    Exercises  - Seated Cervical Retraction  - 2 x daily - 7 x weekly - 2 sets - 15 reps  - Seated Scapular Retraction  - 2 x daily - 7 x weekly - 15 reps  - Supine Transversus Abdominis Bracing - Hands on Stomach  - 2 x daily - 7 x weekly - 15 reps  - Supine Bridge  - 2 x daily - 7 x weekly - 15 reps  - Bridge with Hip Abduction and Resistance  - 2 x daily - 7 x weekly - 2 sets - 15 reps  - Supine Shoulder External Rotation with Resistance  - 2 x daily - 7 x weekly - 2 sets - 15 reps  - Supine Shoulder Horizontal Abduction with Resistance  - 2 x daily - 7 x weekly - 2 sets - 15 reps    Charges: Therapeutic Exercise x 3    Assessment:  Tx consisted of strengthening, stretching, dynamic stabilization to help with pain, functional ability of the cervical, lumbar spine. Pt required Mod cueing for all exercises given. Pt tolerated session well, requires further skill therapy to reduce pain and return to PLOF.        Plan: Continue with strength, stretching, dynamic balance exercises for the lumbar, cervical, and upper thoracic spine.       ALBANIA ADORNO-PT

## 2024-07-01 ENCOUNTER — APPOINTMENT (OUTPATIENT)
Dept: PRIMARY CARE | Facility: CLINIC | Age: 79
End: 2024-07-01
Payer: MEDICARE

## 2024-07-09 ENCOUNTER — DOCUMENTATION (OUTPATIENT)
Dept: PHYSICAL THERAPY | Facility: CLINIC | Age: 79
End: 2024-07-09
Payer: MEDICARE

## 2024-07-09 PROBLEM — M54.2 NECK PAIN: Status: RESOLVED | Noted: 2024-06-12 | Resolved: 2024-07-09

## 2024-07-09 PROBLEM — M54.9 BACK PAIN: Status: RESOLVED | Noted: 2024-06-12 | Resolved: 2024-07-09

## 2024-07-09 NOTE — PROGRESS NOTES
Physical Therapy    Discharge Summary    Name: Chirag Beltran  MRN: [id number]  : 1945  Date: 24    Visit Count: 2  Insurance: Medicare  Medicare Certification Dates: 24 to 24    Discharge Summary: PT    Discharge Information: Date of discharge 24, Date of last visit 24, Date of evaluation 24, Number of attended visits 2, Referred by Donovan, and Referred for cervical radiculopathy, chronic back pain    Discharge Status: unable to assess due to pt not returning to therapy     Rehab Discharge Reason: Patient requested due to financial and/or insurance constraints

## 2024-07-11 ENCOUNTER — APPOINTMENT (OUTPATIENT)
Dept: PHYSICAL THERAPY | Facility: CLINIC | Age: 79
End: 2024-07-11
Payer: MEDICARE

## 2024-07-15 ENCOUNTER — APPOINTMENT (OUTPATIENT)
Dept: BEHAVIORAL HEALTH | Facility: CLINIC | Age: 79
End: 2024-07-15
Payer: MEDICARE

## 2024-07-18 ENCOUNTER — TELEPHONE (OUTPATIENT)
Dept: SLEEP MEDICINE | Facility: HOSPITAL | Age: 79
End: 2024-07-18
Payer: MEDICARE

## 2024-07-18 NOTE — TELEPHONE ENCOUNTER
Pt called because she hasn't been contacted by [facility] yet. Will call [facility] to get things expedited, also gave pt number to call for [facility].   
No

## 2024-07-23 ENCOUNTER — APPOINTMENT (OUTPATIENT)
Dept: PHYSICAL THERAPY | Facility: CLINIC | Age: 79
End: 2024-07-23
Payer: MEDICARE

## 2024-07-31 ENCOUNTER — HOSPITAL ENCOUNTER (OUTPATIENT)
Dept: RADIOLOGY | Facility: CLINIC | Age: 79
Discharge: HOME | End: 2024-07-31
Payer: MEDICARE

## 2024-07-31 VITALS — WEIGHT: 148 LBS | HEIGHT: 61 IN | BODY MASS INDEX: 27.94 KG/M2

## 2024-07-31 DIAGNOSIS — M54.50 CHRONIC MIDLINE LOW BACK PAIN WITHOUT SCIATICA: ICD-10-CM

## 2024-07-31 DIAGNOSIS — Z12.31 ENCOUNTER FOR SCREENING MAMMOGRAM FOR MALIGNANT NEOPLASM OF BREAST: ICD-10-CM

## 2024-07-31 DIAGNOSIS — G89.29 CHRONIC MIDLINE LOW BACK PAIN WITHOUT SCIATICA: ICD-10-CM

## 2024-07-31 DIAGNOSIS — Z00.00 HEALTH CARE MAINTENANCE: ICD-10-CM

## 2024-07-31 DIAGNOSIS — Z78.0 MENOPAUSE: ICD-10-CM

## 2024-07-31 DIAGNOSIS — M54.12 CERVICAL RADICULOPATHY: ICD-10-CM

## 2024-07-31 PROCEDURE — 77080 DXA BONE DENSITY AXIAL: CPT

## 2024-07-31 PROCEDURE — 72100 X-RAY EXAM L-S SPINE 2/3 VWS: CPT

## 2024-07-31 PROCEDURE — 72040 X-RAY EXAM NECK SPINE 2-3 VW: CPT | Performed by: RADIOLOGY

## 2024-07-31 PROCEDURE — 72100 X-RAY EXAM L-S SPINE 2/3 VWS: CPT | Performed by: RADIOLOGY

## 2024-07-31 PROCEDURE — 77067 SCR MAMMO BI INCL CAD: CPT

## 2024-07-31 PROCEDURE — 77080 DXA BONE DENSITY AXIAL: CPT | Performed by: RADIOLOGY

## 2024-07-31 PROCEDURE — 72040 X-RAY EXAM NECK SPINE 2-3 VW: CPT

## 2024-07-31 ASSESSMENT — LIFESTYLE VARIABLES
CURRENT_SMOKER: N
3_OR_MORE_DRINKS_PER_DAY: N

## 2024-08-22 ENCOUNTER — HOSPITAL ENCOUNTER (OUTPATIENT)
Dept: RADIOLOGY | Facility: EXTERNAL LOCATION | Age: 79
Discharge: HOME | End: 2024-08-22

## 2024-12-11 ENCOUNTER — APPOINTMENT (OUTPATIENT)
Dept: PRIMARY CARE | Facility: CLINIC | Age: 79
End: 2024-12-11
Payer: MEDICARE

## 2024-12-11 VITALS
HEIGHT: 61 IN | SYSTOLIC BLOOD PRESSURE: 100 MMHG | DIASTOLIC BLOOD PRESSURE: 60 MMHG | WEIGHT: 138 LBS | BODY MASS INDEX: 26.06 KG/M2 | HEART RATE: 75 BPM | OXYGEN SATURATION: 96 %

## 2024-12-11 DIAGNOSIS — H81.13 BENIGN PAROXYSMAL POSITIONAL VERTIGO DUE TO BILATERAL VESTIBULAR DISORDER: Primary | ICD-10-CM

## 2024-12-11 PROCEDURE — 99213 OFFICE O/P EST LOW 20 MIN: CPT | Performed by: STUDENT IN AN ORGANIZED HEALTH CARE EDUCATION/TRAINING PROGRAM

## 2024-12-11 PROCEDURE — 3074F SYST BP LT 130 MM HG: CPT | Performed by: STUDENT IN AN ORGANIZED HEALTH CARE EDUCATION/TRAINING PROGRAM

## 2024-12-11 PROCEDURE — 3078F DIAST BP <80 MM HG: CPT | Performed by: STUDENT IN AN ORGANIZED HEALTH CARE EDUCATION/TRAINING PROGRAM

## 2024-12-11 PROCEDURE — 1123F ACP DISCUSS/DSCN MKR DOCD: CPT | Performed by: STUDENT IN AN ORGANIZED HEALTH CARE EDUCATION/TRAINING PROGRAM

## 2024-12-11 RX ORDER — MECLIZINE HCL 12.5 MG 12.5 MG/1
12.5 TABLET ORAL EVERY 12 HOURS PRN
Qty: 30 TABLET | Refills: 11 | Status: SHIPPED | OUTPATIENT
Start: 2024-12-11 | End: 2025-12-11

## 2024-12-11 ASSESSMENT — PATIENT HEALTH QUESTIONNAIRE - PHQ9
SUM OF ALL RESPONSES TO PHQ9 QUESTIONS 1 AND 2: 0
2. FEELING DOWN, DEPRESSED OR HOPELESS: NOT AT ALL
1. LITTLE INTEREST OR PLEASURE IN DOING THINGS: NOT AT ALL

## 2024-12-11 ASSESSMENT — COLUMBIA-SUICIDE SEVERITY RATING SCALE - C-SSRS
6. HAVE YOU EVER DONE ANYTHING, STARTED TO DO ANYTHING, OR PREPARED TO DO ANYTHING TO END YOUR LIFE?: NO
1. IN THE PAST MONTH, HAVE YOU WISHED YOU WERE DEAD OR WISHED YOU COULD GO TO SLEEP AND NOT WAKE UP?: NO
2. HAVE YOU ACTUALLY HAD ANY THOUGHTS OF KILLING YOURSELF?: NO

## 2024-12-11 NOTE — PROGRESS NOTES
Subjective   Patient ID: Chirag Beltran is a 79 y.o. female who presents for Follow-up (FUV - Dizziness-).  HPI  Patient has a history of diabetes, thyroidism and hyperlipidemia currently not on any medication is here for the following concern   # Dizziness 1 year     When bending over   Getting worse when lying in bed   Change in position     Projectile vomiting when she had covid   No other focal neurological deficit endorsed.  No head trauma        ]dm assessment/plan  1.  Dizziness  - Orthostatic negative  [place]-Hallpike positive  History of dry meclizine.  Physical therapy ordered  Patient to seek medical attention immediately / call 911  if has worsening of symptoms  or develops alarm symptoms as discussed. Verbalizes understanding   History reviewed. No pertinent past medical history.   Past Surgical History:   Procedure Laterality Date    OTHER SURGICAL HISTORY  2019    Appendectomy    OTHER SURGICAL HISTORY  2019     section    OTHER SURGICAL HISTORY  2019    Tonsillectomy      Family History   Problem Relation Name Age of Onset    Heart disease Mother      Diabetes Father        Allergies   Allergen Reactions    Paxlovid [Nirmatrelvir-Ritonavir] Diarrhea    Sertraline GI Upset and Other     2001;DIARRHEA, 2001;DIARRHEA    Other reaction(s): GI Upset   2001;DIARRHEA, 2001;DIARRHEA    Sulfa (Sulfonamide Antibiotics) Hives     Hives, Hives    Sulfamethoxazole-Trimethoprim Hives          Occupation:     Review of Systems   Constitutional:  Negative for activity change and fever.   HENT:  Negative for congestion.    Respiratory:  Negative for cough, shortness of breath and wheezing.    Cardiovascular:  Negative for chest pain and leg swelling.   Gastrointestinal:  Negative for abdominal pain, constipation, nausea and vomiting.   Endocrine: Negative for cold intolerance.   Genitourinary:  Negative for dysuria, hematuria and urgency.   Neurological:   "Positive for light-headedness. Negative for dizziness, speech difficulty, weakness and numbness.   Psychiatric/Behavioral:  Negative for self-injury and suicidal ideas.        Objective   Visit Vitals  /60 (BP Location: Left arm, Patient Position: Sitting, BP Cuff Size: Adult)   Pulse 75   Ht 1.549 m (5' 1\")   Wt 62.6 kg (138 lb)   SpO2 96%   BMI 26.07 kg/m²   OB Status Postmenopausal   Smoking Status Never   BSA 1.64 m²      Physical Exam  Constitutional:       Appearance: Normal appearance.   HENT:      Head: Normocephalic and atraumatic.      Nose: Nose normal.      Mouth/Throat:      Mouth: Mucous membranes are moist.   Eyes:      Conjunctiva/sclera: Conjunctivae normal.      Pupils: Pupils are equal, round, and reactive to light.   Cardiovascular:      Rate and Rhythm: Normal rate and regular rhythm.      Pulses: Normal pulses.      Heart sounds: Normal heart sounds.   Pulmonary:      Effort: Pulmonary effort is normal.      Breath sounds: Normal breath sounds.   Musculoskeletal:         General: Normal range of motion.      Cervical back: Neck supple.   Skin:     General: Skin is warm.   Neurological:      General: No focal deficit present.      Mental Status: She is alert and oriented to person, place, and time.      Cranial Nerves: No cranial nerve deficit.      Sensory: No sensory deficit.      Motor: No weakness.      Coordination: Coordination normal.      Gait: Gait normal.      Deep Tendon Reflexes: Reflexes normal.   Psychiatric:         Mood and Affect: Mood normal.         Behavior: Behavior normal.         Thought Content: Thought content normal.         Judgment: Judgment normal.         Assessment/Plan   Diagnoses and all orders for this visit:  Benign paroxysmal positional vertigo due to bilateral vestibular disorder  -     meclizine (Antivert) 12.5 mg tablet; Take 1 tablet (12.5 mg) by mouth every 12 hours if needed for dizziness. (Patient not taking: Reported on 1/31/2025)  -     Referral " to Physical Therapy; Future

## 2025-01-31 ENCOUNTER — OFFICE VISIT (OUTPATIENT)
Dept: PRIMARY CARE | Facility: CLINIC | Age: 80
End: 2025-01-31
Payer: MEDICARE

## 2025-01-31 VITALS
HEIGHT: 61 IN | SYSTOLIC BLOOD PRESSURE: 106 MMHG | BODY MASS INDEX: 24.55 KG/M2 | DIASTOLIC BLOOD PRESSURE: 67 MMHG | WEIGHT: 130 LBS | OXYGEN SATURATION: 95 % | HEART RATE: 92 BPM

## 2025-01-31 DIAGNOSIS — N81.4 PROLAPSED UTERUS: Primary | ICD-10-CM

## 2025-01-31 DIAGNOSIS — M85.80 OSTEOPENIA WITH HIGH RISK OF FRACTURE: ICD-10-CM

## 2025-01-31 DIAGNOSIS — E78.5 HYPERLIPIDEMIA, UNSPECIFIED HYPERLIPIDEMIA TYPE: ICD-10-CM

## 2025-01-31 DIAGNOSIS — E11.9 TYPE 2 DIABETES MELLITUS WITHOUT COMPLICATION, WITHOUT LONG-TERM CURRENT USE OF INSULIN (MULTI): ICD-10-CM

## 2025-01-31 PROCEDURE — 1159F MED LIST DOCD IN RCRD: CPT | Performed by: STUDENT IN AN ORGANIZED HEALTH CARE EDUCATION/TRAINING PROGRAM

## 2025-01-31 PROCEDURE — 1123F ACP DISCUSS/DSCN MKR DOCD: CPT | Performed by: STUDENT IN AN ORGANIZED HEALTH CARE EDUCATION/TRAINING PROGRAM

## 2025-01-31 PROCEDURE — 1036F TOBACCO NON-USER: CPT | Performed by: STUDENT IN AN ORGANIZED HEALTH CARE EDUCATION/TRAINING PROGRAM

## 2025-01-31 PROCEDURE — 3078F DIAST BP <80 MM HG: CPT | Performed by: STUDENT IN AN ORGANIZED HEALTH CARE EDUCATION/TRAINING PROGRAM

## 2025-01-31 PROCEDURE — 3074F SYST BP LT 130 MM HG: CPT | Performed by: STUDENT IN AN ORGANIZED HEALTH CARE EDUCATION/TRAINING PROGRAM

## 2025-01-31 PROCEDURE — 99214 OFFICE O/P EST MOD 30 MIN: CPT | Performed by: STUDENT IN AN ORGANIZED HEALTH CARE EDUCATION/TRAINING PROGRAM

## 2025-01-31 ASSESSMENT — ENCOUNTER SYMPTOMS
FEVER: 0
WEAKNESS: 0
VOMITING: 0
ABDOMINAL PAIN: 0
NUMBNESS: 0
SPEECH DIFFICULTY: 0
SHORTNESS OF BREATH: 0
DEPRESSION: 1
ACTIVITY CHANGE: 0
DIZZINESS: 0
CONSTIPATION: 0
COUGH: 0
DYSURIA: 0
NAUSEA: 0
HEMATURIA: 0
LOSS OF SENSATION IN FEET: 0
OCCASIONAL FEELINGS OF UNSTEADINESS: 1
WHEEZING: 0
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
## 2025-02-04 ENCOUNTER — OFFICE VISIT (OUTPATIENT)
Dept: OBSTETRICS AND GYNECOLOGY | Facility: CLINIC | Age: 80
End: 2025-02-04
Payer: MEDICARE

## 2025-02-04 VITALS
HEIGHT: 61 IN | DIASTOLIC BLOOD PRESSURE: 75 MMHG | WEIGHT: 130 LBS | TEMPERATURE: 97.9 F | RESPIRATION RATE: 16 BRPM | SYSTOLIC BLOOD PRESSURE: 128 MMHG | OXYGEN SATURATION: 98 % | BODY MASS INDEX: 24.55 KG/M2 | HEART RATE: 84 BPM

## 2025-02-04 DIAGNOSIS — N81.4 UTERINE PROLAPSE: Primary | ICD-10-CM

## 2025-02-04 DIAGNOSIS — Z13.89 SCREENING FOR BLOOD OR PROTEIN IN URINE: ICD-10-CM

## 2025-02-04 PROCEDURE — 1159F MED LIST DOCD IN RCRD: CPT | Performed by: NURSE PRACTITIONER

## 2025-02-04 PROCEDURE — 1126F AMNT PAIN NOTED NONE PRSNT: CPT | Performed by: NURSE PRACTITIONER

## 2025-02-04 PROCEDURE — 57160 INSERT PESSARY/OTHER DEVICE: CPT | Performed by: NURSE PRACTITIONER

## 2025-02-04 PROCEDURE — 1036F TOBACCO NON-USER: CPT | Performed by: NURSE PRACTITIONER

## 2025-02-04 PROCEDURE — 3074F SYST BP LT 130 MM HG: CPT | Performed by: NURSE PRACTITIONER

## 2025-02-04 PROCEDURE — 1160F RVW MEDS BY RX/DR IN RCRD: CPT | Performed by: NURSE PRACTITIONER

## 2025-02-04 PROCEDURE — A4562 PESSARY, NON RUBBER,ANY TYPE: HCPCS | Performed by: NURSE PRACTITIONER

## 2025-02-04 PROCEDURE — 99213 OFFICE O/P EST LOW 20 MIN: CPT | Mod: 25 | Performed by: NURSE PRACTITIONER

## 2025-02-04 PROCEDURE — 99203 OFFICE O/P NEW LOW 30 MIN: CPT | Performed by: NURSE PRACTITIONER

## 2025-02-04 PROCEDURE — 3078F DIAST BP <80 MM HG: CPT | Performed by: NURSE PRACTITIONER

## 2025-02-04 PROCEDURE — 1123F ACP DISCUSS/DSCN MKR DOCD: CPT | Performed by: NURSE PRACTITIONER

## 2025-02-04 SDOH — ECONOMIC STABILITY: FOOD INSECURITY: WITHIN THE PAST 12 MONTHS, YOU WORRIED THAT YOUR FOOD WOULD RUN OUT BEFORE YOU GOT MONEY TO BUY MORE.: NEVER TRUE

## 2025-02-04 SDOH — ECONOMIC STABILITY: FOOD INSECURITY: WITHIN THE PAST 12 MONTHS, THE FOOD YOU BOUGHT JUST DIDN'T LAST AND YOU DIDN'T HAVE MONEY TO GET MORE.: NEVER TRUE

## 2025-02-04 ASSESSMENT — LIFESTYLE VARIABLES
HOW MANY STANDARD DRINKS CONTAINING ALCOHOL DO YOU HAVE ON A TYPICAL DAY: PATIENT DOES NOT DRINK
HOW OFTEN DO YOU HAVE SIX OR MORE DRINKS ON ONE OCCASION: NEVER
HOW OFTEN DO YOU HAVE A DRINK CONTAINING ALCOHOL: NEVER
AUDIT-C TOTAL SCORE: 0
SKIP TO QUESTIONS 9-10: 1

## 2025-02-04 ASSESSMENT — ENCOUNTER SYMPTOMS
OCCASIONAL FEELINGS OF UNSTEADINESS: 1
LOSS OF SENSATION IN FEET: 0

## 2025-02-04 ASSESSMENT — PAIN SCALES - GENERAL: PAINLEVEL_OUTOF10: 0-NO PAIN

## 2025-02-04 NOTE — PROGRESS NOTES
"Chirag Beltran is a 79 y.o. referred by Dr. Man for prolapse. Was treated for OAB many years ago with oxybutynin.     Chief Complaint: prolapse     OB/GYN History:   -   - Number of prior vaginal deliveries: 1  - Number of prior c-sections: 2  - Menopausal: Yes  - Sexually active:  No due to not having a partner     Prolapse symptoms:   - reports prolapse symptoms of feeling a bulge when she showers  - sitting is also uncomfortable with the POP  - reports occasional sense of incomplete emptying, sometimes her urine stream will be a trickle when she has urgency     Urinary symptoms:   - LUCITA: No  - UUI: Yes, Ditropan XL worked very well but then stopped working later. She uses a bedside commode to prevent UUI. Wears maxi pads for leakage. Most of the time she makes it to the toilet dry.     Bowels  - BMs daily, reports soft and easy to pass    Health Maintenance  - Mammogram up to date: Yes - 24  - Colonoscopy up to date: No    Past medical and surgical hx reviewed - pertinent for HTN, HLD, T2DM, hypothyroid, JJ    Interval History:   - Son with seizure disorder. She is his caregiver.     No LMP recorded. Patient is postmenopausal.  Body mass index is 24.56 kg/m².  /75   Pulse 84   Temp 36.6 °C (97.9 °F) (Temporal)   Resp 16   Ht 1.549 m (5' 1\")   Wt 59 kg (130 lb)   SpO2 98%   BMI 24.56 kg/m²     Physical Exam  Constitutional:       Appearance: Normal appearance. She is normal weight.   Genitourinary:      Vulva, bladder, rectum and urethral meatus normal.      Mild vaginal atrophy present.     Pelvic floor neuro is intact.  POP-Q measurements were:      Aa: +1, Ba: C: -8     gH: 3, pB: TVL: 10     Ap: -3, Bp: D:   Pulmonary:      Effort: Pulmonary effort is normal.   Neurological:      Mental Status: She is alert.   Psychiatric:         Mood and Affect: Mood normal.         Behavior: Behavior normal.         Thought Content: Thought content normal.         Judgment: " Judgment normal.     PVR (by Ultrasound): 15 ml    Patient ID: Chirag Beltran is a 79 y.o. female.    Pessary    Date/Time: 2/4/2025 10:32 AM    Performed by: JAMIE Barnett  Authorized by: JAMIE Barnett    Procedure:     Pessaries tried:  #3 ring with support pessary  Outcomes:     How did patient test pessary?:  Valsalva, voiding  Comments:     Procedure comments:  Fit with #3 ring with support pessary    Assessment and Plan  79 y.o. female being assessed for stage 2-3 cystocele. Co morbidities: HTN, HLD, T2DM, hypothyroid, JJ.     Diagnoses:   #1 Stage 2-3 cystocele    Plan:   1. Stage 2-3 cystocele  - Discussed that pelvic organ prolapse is a common and benign process in women and does not need to be treated unless it becomes bothersome or causes incomplete bladder emptying.   - Treatment options include pessary use and surgery.   - Patient plans to proceed with pessary fitting.   - Explained how to clean and replace the pessary at home. Clean the pessary with soap and water and leave it out over night.   - Fit with #3 ring with support pessary.      Pt to do home maintenance because of cost of appointment copay. She will attempt removal and reinsertion and RTC if she is unable to manage pessary on own or if the pessary falls out.       Scribe Attestation:   IKarol, am scribing for virtually, and in the presence of JAMIE Barnett on 02/04/2025 at 10:41 AM.     I, Samina Leonard, personally performed the services described in the documentation as scribed in my presence and confirm it is both complete and accurate.

## 2025-02-11 ENCOUNTER — TELEPHONE (OUTPATIENT)
Dept: PRIMARY CARE | Facility: CLINIC | Age: 80
End: 2025-02-11

## 2025-02-11 DIAGNOSIS — Z00.00 HEALTHCARE MAINTENANCE: Primary | ICD-10-CM

## 2025-02-17 ENCOUNTER — TELEPHONE (OUTPATIENT)
Dept: OBSTETRICS AND GYNECOLOGY | Facility: CLINIC | Age: 80
End: 2025-02-17
Payer: MEDICARE

## 2025-02-17 DIAGNOSIS — N32.81 OAB (OVERACTIVE BLADDER): Primary | ICD-10-CM

## 2025-02-17 DIAGNOSIS — N39.3 STRESS INCONTINENCE IN FEMALE: ICD-10-CM

## 2025-02-17 NOTE — TELEPHONE ENCOUNTER
Pt was seen 2/4/25. DOS notes state: Urinary symptoms:   - LUCITA: No  - UUI: Yes, Ditropan XL worked very well but then stopped working later. She uses a bedside commode to prevent UUI. Wears maxi pads for leakage. Most of the time she makes it to the toilet dry.    CH[facility] Rehabilitation HospitalCB.

## 2025-02-18 RX ORDER — OXYBUTYNIN CHLORIDE 5 MG/1
5 TABLET, EXTENDED RELEASE ORAL DAILY
Qty: 90 TABLET | Refills: 0 | Status: SHIPPED | OUTPATIENT
Start: 2025-02-18 | End: 2026-02-18

## 2025-02-19 NOTE — PROGRESS NOTES
[facility]  Followup Visit Note    Virtual or Telephone Consent  An interactive audio and video telecommunication system which permits real time communications between the patient (at the originating site) and provider (at the distant site) was utilized to provide this telehealth service.   Verbal consent was requested and obtained from Chirag Beltran on this date, 02/20/25 for a telehealth visit.     HISTORY OF PRESENT ILLNESS   Chirag Beltran is a 79 y.o. female who presents to a [facility] for followup.       Current History    On today's visit, the patient reports she is still struggling to stay asleep with CPAP    She struggled to tolerate CPAP due to mask fit.    She can still go to sleep okay.    Pressure feels comfortable.  Nasal mask(n30) is comfortable.  Humidity is okay without water.    Tried melatonin but did not help. Used doxepin and gabapentin to help stay asleep in past. Tolerated this well and it helped her fall asleep.    Currently has COVID. Struggling with symptoms and side effects from paxlovid.    Previous visit 02/26/2024  Assessment/Plan (Dianne Clarke, VANE-CNP )  Chirag Beltran is a 78 y.o. female who is seen to evaluate for mild obstructive sleep apnea. The pathophysiology of sleep apnea, diagnostic testing (HST vs PSG), treatment options (PAP, oral appliance, surgery, hypoglossal nerve stimulator called Inspire), and supportive management (weight loss, positional therapy, smoking cessation, avoidance of alcohol and sedatives) were discussed with the patient in detail. Risk factors of sleep apnea as well as cardiometabolic and neurocognitive sequelae associated with untreated sleep apnea were also discussed. Lastly, patient was advised to avoid driving vehicle or operating heavy machinery when sleepy.     Chirag Beltran with the following problems:     # OBSTRUCTIVE SLEEP APNEA  -Will order  "in-lab test to re-evaluate JJ and optimal CPAP settings.  -May benefit from non-full-face mask, not sure the current setting.  -Emphasized diet, exercise, and weight loss in the clinic, as were non-supine sleep, avoiding alcohol in the late evening, and driving or operating heavy machinery when sleepy.  -Chirag Rubyverbalizes understanding of the above instructions and risks.    #CHRONIC SLEEP ONSET AND SLEEP MAINTENANCE INSOMNIA  -Continue liquid doxepin as needed for middle of the night waking  -Get out of bed if unable to fall back to sleep in 15-20 minutes  -She doesn't nap, no caffeine later in day  -Possibly secondary to insufficiently treated JJ  -Will re-evaluate after sleep study     # DEPRESSION and ANXIETY:  -Refer to see a psychiatric  -Denies HI/SI    #HYPERTENSION  -BP was controlled in the clinic.  -F/U with PCP.  -Denies headache/palpitation and syncope in the clinic.       RTC 2-3 weeks after sleep study      PAP Adherence:        Sleep Scales:  ESS: 2     REVIEW OF SYSTEMS    All other systems negative      PHYSICAL EXAM     VITAL SIGNS: There were no vitals taken for this visit.     PREVIOUS WEIGHTS:  Wt Readings from Last 3 Encounters:   02/04/25 59 kg (130 lb)   01/31/25 59 kg (130 lb)   12/11/24 62.6 kg (138 lb)       Constitutional: Alert and oriented, cooperative, no obvious distress   HEENT: Non icteric or anemic, EOM WNL bilaterally   Neck: Supple, no JVD, no goiter, no adenopathy, no rigidity  Extremities: No clubbing, no LL edema   Neuromuscular: Cranial nerves grossly intact, no focal deficits     RESULTS/DATA     No results found for: \"IRON\", \"TRANSFERRIN\", \"IRONSAT\", \"TIBC\", \"FERRITIN\"      ASSESSMENT/PLAN     Ms. Beltran is a 79 y.o. female and returns in followup for the following issues:    OBSTRUCTIVE SLEEP APNEA  -Benefiting from CPAP  -Fair compliance, JJ well controlled per download when she uses  -supplies [facility]    CHRONIC INSOMNIA / FREQUENT WAKING AT NIGHT " / ANXIETY AND DEPRESSION  -likely secondary to anxiety/depression  -start gabapentin 300 mg at bedtime  -doxepin PRN 0.3 mg    Follow up 2-3 months

## 2025-02-20 ENCOUNTER — OFFICE VISIT (OUTPATIENT)
Dept: SLEEP MEDICINE | Facility: CLINIC | Age: 80
End: 2025-02-20
Payer: MEDICARE

## 2025-02-20 DIAGNOSIS — F41.9 ANXIETY AND DEPRESSION: ICD-10-CM

## 2025-02-20 DIAGNOSIS — F32.A ANXIETY AND DEPRESSION: ICD-10-CM

## 2025-02-20 DIAGNOSIS — G47.33 OSA (OBSTRUCTIVE SLEEP APNEA): Primary | ICD-10-CM

## 2025-02-20 DIAGNOSIS — G47.00 INSOMNIA, UNSPECIFIED TYPE: ICD-10-CM

## 2025-02-20 PROCEDURE — 1159F MED LIST DOCD IN RCRD: CPT | Performed by: PHYSICIAN ASSISTANT

## 2025-02-20 PROCEDURE — 99214 OFFICE O/P EST MOD 30 MIN: CPT | Performed by: PHYSICIAN ASSISTANT

## 2025-02-20 PROCEDURE — 1160F RVW MEDS BY RX/DR IN RCRD: CPT | Performed by: PHYSICIAN ASSISTANT

## 2025-02-20 PROCEDURE — G2211 COMPLEX E/M VISIT ADD ON: HCPCS | Performed by: PHYSICIAN ASSISTANT

## 2025-02-20 PROCEDURE — 1123F ACP DISCUSS/DSCN MKR DOCD: CPT | Performed by: PHYSICIAN ASSISTANT

## 2025-02-20 PROCEDURE — 1036F TOBACCO NON-USER: CPT | Performed by: PHYSICIAN ASSISTANT

## 2025-02-20 PROCEDURE — 99214 OFFICE O/P EST MOD 30 MIN: CPT | Mod: 95 | Performed by: PHYSICIAN ASSISTANT

## 2025-02-20 RX ORDER — DOXEPIN HYDROCHLORIDE 10 MG/ML
10 SOLUTION ORAL NIGHTLY
Qty: 120 ML | Refills: 0 | Status: SHIPPED | OUTPATIENT
Start: 2025-02-20 | End: 2026-02-20

## 2025-02-20 RX ORDER — GABAPENTIN 300 MG/1
300 CAPSULE ORAL NIGHTLY
Qty: 30 CAPSULE | Refills: 3 | Status: SHIPPED | OUTPATIENT
Start: 2025-02-20 | End: 2025-06-20

## 2025-02-20 ASSESSMENT — PATIENT HEALTH QUESTIONNAIRE - PHQ9
2. FEELING DOWN, DEPRESSED OR HOPELESS: SEVERAL DAYS
10. IF YOU CHECKED OFF ANY PROBLEMS, HOW DIFFICULT HAVE THESE PROBLEMS MADE IT FOR YOU TO DO YOUR WORK, TAKE CARE OF THINGS AT HOME, OR GET ALONG WITH OTHER PEOPLE: SOMEWHAT DIFFICULT
SUM OF ALL RESPONSES TO PHQ9 QUESTIONS 1 & 2: 2
1. LITTLE INTEREST OR PLEASURE IN DOING THINGS: SEVERAL DAYS

## 2025-02-20 ASSESSMENT — SLEEP AND FATIGUE QUESTIONNAIRES
HOW LIKELY ARE YOU TO NOD OFF OR FALL ASLEEP WHILE WATCHING TV: MODERATE CHANCE OF DOZING
HOW LIKELY ARE YOU TO NOD OFF OR FALL ASLEEP IN A CAR, WHILE STOPPED FOR A FEW MINUTES IN TRAFFIC: WOULD NEVER DOZE
ESS-CHAD TOTAL SCORE: 2
SITING INACTIVE IN A PUBLIC PLACE LIKE A CLASS ROOM OR A MOVIE THEATER: WOULD NEVER DOZE
HOW LIKELY ARE YOU TO NOD OFF OR FALL ASLEEP WHILE SITTING AND READING: WOULD NEVER DOZE
HOW LIKELY ARE YOU TO NOD OFF OR FALL ASLEEP WHILE SITTING AND TALKING TO SOMEONE: WOULD NEVER DOZE
HOW LIKELY ARE YOU TO NOD OFF OR FALL ASLEEP WHILE SITTING QUIETLY AFTER LUNCH WITHOUT ALCOHOL: WOULD NEVER DOZE
HOW LIKELY ARE YOU TO NOD OFF OR FALL ASLEEP WHEN YOU ARE A PASSENGER IN A CAR FOR AN HOUR WITHOUT A BREAK: WOULD NEVER DOZE
HOW LIKELY ARE YOU TO NOD OFF OR FALL ASLEEP WHILE LYING DOWN TO REST IN THE AFTERNOON WHEN CIRCUMSTANCES PERMIT: WOULD NEVER DOZE

## 2025-02-20 ASSESSMENT — LIFESTYLE VARIABLES: HOW MANY STANDARD DRINKS CONTAINING ALCOHOL DO YOU HAVE ON A TYPICAL DAY: PATIENT DOES NOT DRINK

## 2025-02-21 ASSESSMENT — ENCOUNTER SYMPTOMS
WEAKNESS: 0
HEMATURIA: 0
WHEEZING: 0
ABDOMINAL PAIN: 0
LIGHT-HEADEDNESS: 1
COUGH: 0
VOMITING: 0
SPEECH DIFFICULTY: 0
ACTIVITY CHANGE: 0
NAUSEA: 0
FEVER: 0
DYSURIA: 0
CONSTIPATION: 0
NUMBNESS: 0
SHORTNESS OF BREATH: 0
DIZZINESS: 0

## 2025-02-25 DIAGNOSIS — G47.00 INSOMNIA, UNSPECIFIED TYPE: Primary | ICD-10-CM

## 2025-02-25 RX ORDER — ESZOPICLONE 1 MG/1
1 TABLET, FILM COATED ORAL NIGHTLY
Qty: 14 TABLET | Refills: 0 | Status: SHIPPED | OUTPATIENT
Start: 2025-02-25 | End: 2025-03-11

## 2025-02-25 NOTE — PROGRESS NOTES
Short term lunesta as she can't tolerate GBP due to side effects - recent COVID/GI issues with paxlovid

## 2025-02-28 ENCOUNTER — TELEPHONE (OUTPATIENT)
Dept: OBSTETRICS AND GYNECOLOGY | Facility: CLINIC | Age: 80
End: 2025-02-28

## 2025-02-28 DIAGNOSIS — G47.00 INSOMNIA, UNSPECIFIED TYPE: Primary | ICD-10-CM

## 2025-02-28 RX ORDER — MIRTAZAPINE 7.5 MG/1
7.5 TABLET, FILM COATED ORAL NIGHTLY
Qty: 30 TABLET | Refills: 0 | Status: SHIPPED | OUTPATIENT
Start: 2025-02-28 | End: 2025-03-30

## 2025-02-28 NOTE — TELEPHONE ENCOUNTER
Attempted to call pt to further discuss her MyChart message she sent.  Got he voice mail.  Message left to call office.  Pt stated her pessary fell out and wanted to know what to do.

## 2025-02-28 NOTE — TELEPHONE ENCOUNTER
"Pt returned call to office.   Voiced frustration of being billed  301 dollars for her NP visit with beata and pessary fitting and insertion.  Pt wants clarification that this is an example of a \"surgical procedure\"  Pt states her pessary fell out after she had been coughing.  Pt is going to try to reinsert it herself as she is concerned what the cost would be to her if had to come back in for a follow up appt.  Nurse will make beata aware on Monday.    "

## 2025-05-12 ENCOUNTER — APPOINTMENT (OUTPATIENT)
Dept: ALLERGY | Facility: CLINIC | Age: 80
End: 2025-05-12
Payer: MEDICARE

## 2025-05-12 VITALS
RESPIRATION RATE: 16 BRPM | BODY MASS INDEX: 24.48 KG/M2 | TEMPERATURE: 98.1 F | HEART RATE: 85 BPM | HEIGHT: 62 IN | DIASTOLIC BLOOD PRESSURE: 68 MMHG | SYSTOLIC BLOOD PRESSURE: 128 MMHG | OXYGEN SATURATION: 96 % | WEIGHT: 133 LBS

## 2025-05-12 DIAGNOSIS — J31.0 RHINITIS, UNSPECIFIED TYPE: ICD-10-CM

## 2025-05-12 DIAGNOSIS — L29.9 ITCHING: Primary | ICD-10-CM

## 2025-05-12 PROCEDURE — 99214 OFFICE O/P EST MOD 30 MIN: CPT | Performed by: ALLERGY & IMMUNOLOGY

## 2025-05-12 PROCEDURE — 95004 PERQ TESTS W/ALRGNC XTRCS: CPT | Performed by: ALLERGY & IMMUNOLOGY

## 2025-05-12 RX ORDER — LANCETS 33 GAUGE
EACH MISCELLANEOUS
COMMUNITY
Start: 2025-03-25

## 2025-05-12 RX ORDER — LANCETS 30 GAUGE
EACH MISCELLANEOUS
COMMUNITY
Start: 2025-03-25

## 2025-05-12 RX ORDER — GARLIC 500 MG
1 CAPSULE ORAL
COMMUNITY

## 2025-05-12 RX ORDER — BLOOD-GLUCOSE METER
EACH MISCELLANEOUS
COMMUNITY
Start: 2025-03-20

## 2025-05-12 RX ORDER — LYSINE HCL 500 MG
1 TABLET ORAL
COMMUNITY
Start: 2025-03-19

## 2025-05-12 RX ORDER — CETIRIZINE HYDROCHLORIDE 10 MG/1
10 TABLET ORAL DAILY
Qty: 30 TABLET | Refills: 0 | Status: SHIPPED | OUTPATIENT
Start: 2025-05-12

## 2025-05-12 RX ORDER — ATORVASTATIN CALCIUM 20 MG/1
20 TABLET, FILM COATED ORAL
COMMUNITY
Start: 2025-03-25 | End: 2026-03-25

## 2025-05-12 NOTE — PROGRESS NOTES
Patient ID: Chirag Beltran is a 79 y.o. female.     Chief Complaint: NPV self referred  History Of Present Illness  Chirag Beltran is a 79 y.o. female with PMx pruritus presenting for consultation.     Food Allergy  No    Eczema/ Atopic Dermatitis  Pruritus continued for the last 3 months.  Has not seen any one for this issue.  She is not sure if her primary doctor has known about this issue.  She is concerned there is an allergy that is triggering the itch.  She denies hives and she denies rashes.  No new medications in the last 3 months.  Itch comes day and night. No specific trigger is known.  Mainly occurs on the arms, but sometimes on the ankles.  Uses topical Benadryl cream.  Topical lotions have not helped.  No travel is reported.      Asthma  No    Rhinoconjunctivitis  She denies AR/AC symptoms    Drug Allergy   Reviewed in chart    Insect Allergy   No    Infections  No history of frequent or recurrent infections      Review of Systems    Pertinent positives and negatives have been assessed in the HPI. All other systems have been reviewed and are negative except as noted in the HPI.    Allergies  Paxlovid [nirmatrelvir-ritonavir], Sertraline, Sulfa (sulfonamide antibiotics), and Sulfamethoxazole-trimethoprim    Past Medical History  She has no past medical history on file.    Family History  Family History[1]      Surgical History  She has a past surgical history that includes Other surgical history (03/04/2019); Other surgical history (03/04/2019); and Other surgical history (03/04/2019).    Social/Environmental History  She reports that she has never smoked. She has been exposed to tobacco smoke. She has never used smokeless tobacco. She reports that she does not currently use alcohol. She reports that she does not currently use drugs.    Home: Lives in a house   Floors: tile, wood, carpet  Air Conditioning: Central  Smoker: Her son smokes a pipe and MJ in the home.  Pets: Dog. Does not  "have fleas-used to  Infestations: No  Molds: No  Occupation: retired. Lives with her disabled son    MEDICATIONS  Medications Ordered Prior to Encounter[2]      Physical Exam  Visit Vitals  /68   Pulse 85   Temp 36.7 °C (98.1 °F)   Resp 16   Ht 1.575 m (5' 2\")   Wt 60.3 kg (133 lb)   SpO2 96%   BMI 24.33 kg/m²   OB Status Postmenopausal   Smoking Status Never   BSA 1.62 m²       Wt Readings from Last 1 Encounters:   05/12/25 60.3 kg (133 lb)       Physical Exam    General: Well appearing, no acute distress  Head: Normocephalic, atraumatic, neck supple without lymphadenopathy  Eyes: EOMI, non-injected  Nose: No nasal crease, nares patent, slightly boggy turbinates, minimal discharge  Throat: Normal dentition, no erythema  Heart: Regular rate and rhythm  Lungs: Clear to auscultation bilaterally, effort normal  Abdomen: Soft, non-tender, normal bowel sounds  Extremities: Moves all extremities symmetrically, no edema  Skin: No rashes/lesions  Psych: normal mood and affect    LAB RESULTS:  CBC:  Recent Labs     05/14/24  0846 09/04/19  1123 03/04/19  1026   WBC 9.9 12.1* 10.7   HGB 13.7 13.6 14.2   HCT 45.1 42.1 46.5*    312 319   MCV 88 85 85   EOSABS  --  0.12 0.13       CMP:  Recent Labs     05/14/24  0846 09/04/19  1123 03/04/19  1026    138 141   K 4.4 4.2 4.2    103 105   CO2 26 26 27   ANIONGAP 12 13 13   BUN 17 23 20   CREATININE 0.96 0.84 0.96   EGFR 61  --   --      Recent Labs     05/14/24  0846 09/04/19  1123 03/04/19  1026   ALBUMIN 3.9 4.3 4.3   ALKPHOS 63 59 62   ALT 14 22 18   AST 15 19 14   BILITOT 0.5 0.5 0.5       Recent Labs     09/04/19  1123 03/04/19  1026   EOSABS 0.12 0.13       HEME/ENDO:    Recent Labs     03/07/25  0953 05/14/24  0846 02/07/23  1126 04/28/20  1021 09/04/19  1123 03/04/19  1026   TSH  --  7.63*  --   --  1.98 1.67   HGBA1C 6.8* 6.5* 6.5*   < >  --   --     < > = values in this interval not displayed.     Allergy skin tests 5/12/25    Assessment/Plan   79 " yo woman with 3 month history of itching  Allergy test are negative to cat, dog, dust mite, feather, cockroach, molds and pollens.   Recommend nightly Zyrtec trial and topical anti itch  If continues, recommend dermatology physician  consultation.  Anna Boothe DO          [1]   Family History  Problem Relation Name Age of Onset    Heart disease Mother      Diabetes Father     [2]   Current Outpatient Medications on File Prior to Visit   Medication Sig Dispense Refill    atorvastatin (Lipitor) 20 mg tablet Take 1 tablet (20 mg) by mouth once daily.      calcium carbonate-vit D3-min 600 mg-10 mcg (400 unit) tablet Take 1 tablet by mouth once daily.      OneTouch Delica Plus Lancet 33 gauge misc every day      OneTouch Verio Reflect Meter misc 1 KIT AS NEEDED. AS COVERED BY INSURANCE. DX: DM2, CONTROLLED (E11.9) WITHOUT LONG-TERM INSULIN USE      OneTouch Verio test strips 1 STRIP DAILY. DX: DM2, CONTROLLED (E11.9) WITHOUT LONG-TERM INSULIN USE      oxybutynin XL (Ditropan-XL) 5 mg 24 hr tablet Take 1 tablet (5 mg) by mouth once daily. Do not crush, chew, or split. 90 tablet 0    aspirin 81 mg EC tablet Take 1 tablet (81 mg) by mouth once daily. (Patient not taking: Reported on 2/20/2025)      calcium carbonate-vitamin D3 500 mg-5 mcg (200 unit) tablet Take 1 tablet by mouth once daily. (Patient not taking: Reported on 2/20/2025)      doxepin (SINEquan) 10 mg/mL solution Take 1 mL (10 mg) by mouth once daily at bedtime. When you wake up place 0.3 mL into 4 ounces of water and drink (Patient not taking: Reported on 5/12/2025) 120 mL 0    escitalopram (Lexapro) 5 mg tablet TAKE 1/2 TABLET DAILY FOR 1 WEEK, THEN TAKE 1 TAB DAILY THEREAFTER (Patient not taking: Reported on 2/20/2025) 90 tablet 0    eszopiclone (Lunesta) 1 mg tablet Take 1 tablet (1 mg) by mouth once daily at bedtime for 14 days. Take immediately before bedtime 14 tablet 0    gabapentin (Neurontin) 300 mg capsule Take 1 capsule (300 mg) by  mouth once daily at bedtime. (Patient not taking: Reported on 5/12/2025) 30 capsule 3    garlic 500 mg capsule Take 1 capsule by mouth once daily.      levothyroxine (Synthroid, Levoxyl) 50 mcg tablet Take 1 tablet (50 mcg) by mouth once daily. (Patient not taking: Reported on 1/31/2025)      meclizine (Antivert) 12.5 mg tablet Take 1 tablet (12.5 mg) by mouth every 12 hours if needed for dizziness. (Patient not taking: Reported on 1/31/2025) 30 tablet 11    mirtazapine (Remeron) 7.5 mg tablet Take 1 tablet (7.5 mg) by mouth once daily at bedtime. 30 tablet 0    multivitamin tablet Take 1 tablet by mouth once daily. (Patient not taking: Reported on 2/20/2025)      omega-3 acid ethyl esters (Lovaza) 1 gram capsule Take 200 mg by mouth once daily. (Patient not taking: Reported on 2/20/2025)      traZODone (Desyrel) 50 mg tablet 1-2 TABS AT BEDTIME AS NEEDED FOR SLEEP (Patient not taking: Reported on 1/31/2025) 180 tablet 0     No current facility-administered medications on file prior to visit.

## 2025-05-12 NOTE — PATIENT INSTRUCTIONS
Allergy test are negative to cat, dog, dust mite, feather, cockroach, molds and pollens.   For itch, I recommend Cetirizine at bedtime.  You can try Sarna topical lotion.  This is over the counter and can by found in your local pharmacy.  Follow up with your primary doctor for additional recommendations and referrals after your routine preventative health visit.

## 2025-05-15 ENCOUNTER — APPOINTMENT (OUTPATIENT)
Dept: SLEEP MEDICINE | Facility: CLINIC | Age: 80
End: 2025-05-15
Payer: MEDICARE

## 2025-06-27 ENCOUNTER — HOSPITAL ENCOUNTER (EMERGENCY)
Facility: HOSPITAL | Age: 80
Discharge: HOME | End: 2025-06-27
Attending: EMERGENCY MEDICINE
Payer: MEDICARE

## 2025-06-27 VITALS
HEART RATE: 86 BPM | BODY MASS INDEX: 26.06 KG/M2 | SYSTOLIC BLOOD PRESSURE: 97 MMHG | WEIGHT: 138 LBS | TEMPERATURE: 98.6 F | DIASTOLIC BLOOD PRESSURE: 74 MMHG | OXYGEN SATURATION: 97 % | RESPIRATION RATE: 18 BRPM | HEIGHT: 61 IN

## 2025-06-27 DIAGNOSIS — L73.9 FOLLICULITIS: Primary | ICD-10-CM

## 2025-06-27 LAB
ALBUMIN SERPL BCP-MCNC: 4.1 G/DL (ref 3.4–5)
ALP SERPL-CCNC: 68 U/L (ref 33–136)
ALT SERPL W P-5'-P-CCNC: 11 U/L (ref 7–45)
ANION GAP SERPL CALC-SCNC: 13 MMOL/L (ref 10–20)
AST SERPL W P-5'-P-CCNC: 12 U/L (ref 9–39)
BASOPHILS # BLD AUTO: 0.03 X10*3/UL (ref 0–0.1)
BASOPHILS NFR BLD AUTO: 0.2 %
BILIRUB DIRECT SERPL-MCNC: 0.2 MG/DL (ref 0–0.3)
BILIRUB SERPL-MCNC: 1 MG/DL (ref 0–1.2)
BUN SERPL-MCNC: 16 MG/DL (ref 6–23)
CALCIUM SERPL-MCNC: 9.3 MG/DL (ref 8.6–10.3)
CHLORIDE SERPL-SCNC: 104 MMOL/L (ref 98–107)
CO2 SERPL-SCNC: 28 MMOL/L (ref 21–32)
CREAT SERPL-MCNC: 0.9 MG/DL (ref 0.5–1.05)
CRP SERPL-MCNC: 5.38 MG/DL
EGFRCR SERPLBLD CKD-EPI 2021: 66 ML/MIN/1.73M*2
EOSINOPHIL # BLD AUTO: 0.09 X10*3/UL (ref 0–0.4)
EOSINOPHIL NFR BLD AUTO: 0.6 %
ERYTHROCYTE [DISTWIDTH] IN BLOOD BY AUTOMATED COUNT: 12.9 % (ref 11.5–14.5)
ERYTHROCYTE [SEDIMENTATION RATE] IN BLOOD BY WESTERGREN METHOD: 24 MM/H (ref 0–30)
GLUCOSE BLD MANUAL STRIP-MCNC: 103 MG/DL (ref 74–99)
GLUCOSE SERPL-MCNC: 93 MG/DL (ref 74–99)
HCT VFR BLD AUTO: 42.5 % (ref 36–46)
HGB BLD-MCNC: 13.5 G/DL (ref 12–16)
IMM GRANULOCYTES # BLD AUTO: 0.07 X10*3/UL (ref 0–0.5)
IMM GRANULOCYTES NFR BLD AUTO: 0.5 % (ref 0–0.9)
LACTATE SERPL-SCNC: 0.8 MMOL/L (ref 0.4–2)
LYMPHOCYTES # BLD AUTO: 3.73 X10*3/UL (ref 0.8–3)
LYMPHOCYTES NFR BLD AUTO: 26.6 %
MCH RBC QN AUTO: 27.7 PG (ref 26–34)
MCHC RBC AUTO-ENTMCNC: 31.8 G/DL (ref 32–36)
MCV RBC AUTO: 87 FL (ref 80–100)
MONOCYTES # BLD AUTO: 1.28 X10*3/UL (ref 0.05–0.8)
MONOCYTES NFR BLD AUTO: 9.1 %
NEUTROPHILS # BLD AUTO: 8.82 X10*3/UL (ref 1.6–5.5)
NEUTROPHILS NFR BLD AUTO: 63 %
NRBC BLD-RTO: 0 /100 WBCS (ref 0–0)
PLATELET # BLD AUTO: 329 X10*3/UL (ref 150–450)
POTASSIUM SERPL-SCNC: 3.7 MMOL/L (ref 3.5–5.3)
PROT SERPL-MCNC: 6.6 G/DL (ref 6.4–8.2)
RBC # BLD AUTO: 4.87 X10*6/UL (ref 4–5.2)
SODIUM SERPL-SCNC: 141 MMOL/L (ref 136–145)
WBC # BLD AUTO: 14 X10*3/UL (ref 4.4–11.3)

## 2025-06-27 PROCEDURE — 80053 COMPREHEN METABOLIC PANEL: CPT | Performed by: EMERGENCY MEDICINE

## 2025-06-27 PROCEDURE — 82248 BILIRUBIN DIRECT: CPT | Performed by: EMERGENCY MEDICINE

## 2025-06-27 PROCEDURE — 36415 COLL VENOUS BLD VENIPUNCTURE: CPT | Performed by: EMERGENCY MEDICINE

## 2025-06-27 PROCEDURE — 85025 COMPLETE CBC W/AUTO DIFF WBC: CPT

## 2025-06-27 PROCEDURE — 86140 C-REACTIVE PROTEIN: CPT

## 2025-06-27 PROCEDURE — 85652 RBC SED RATE AUTOMATED: CPT

## 2025-06-27 PROCEDURE — 36415 COLL VENOUS BLD VENIPUNCTURE: CPT

## 2025-06-27 PROCEDURE — 96365 THER/PROPH/DIAG IV INF INIT: CPT

## 2025-06-27 PROCEDURE — 83605 ASSAY OF LACTIC ACID: CPT

## 2025-06-27 PROCEDURE — 82947 ASSAY GLUCOSE BLOOD QUANT: CPT

## 2025-06-27 PROCEDURE — 2500000004 HC RX 250 GENERAL PHARMACY W/ HCPCS (ALT 636 FOR OP/ED): Performed by: EMERGENCY MEDICINE

## 2025-06-27 PROCEDURE — 96367 TX/PROPH/DG ADDL SEQ IV INF: CPT

## 2025-06-27 PROCEDURE — 87040 BLOOD CULTURE FOR BACTERIA: CPT | Mod: AHULAB | Performed by: EMERGENCY MEDICINE

## 2025-06-27 PROCEDURE — 82947 ASSAY GLUCOSE BLOOD QUANT: CPT | Mod: 59

## 2025-06-27 PROCEDURE — 2500000001 HC RX 250 WO HCPCS SELF ADMINISTERED DRUGS (ALT 637 FOR MEDICARE OP): Performed by: EMERGENCY MEDICINE

## 2025-06-27 PROCEDURE — 99284 EMERGENCY DEPT VISIT MOD MDM: CPT | Mod: 25 | Performed by: EMERGENCY MEDICINE

## 2025-06-27 RX ORDER — VANCOMYCIN HYDROCHLORIDE 1 G/200ML
1000 INJECTION, SOLUTION INTRAVENOUS ONCE
Status: COMPLETED | OUTPATIENT
Start: 2025-06-27 | End: 2025-06-27

## 2025-06-27 RX ORDER — ACETAMINOPHEN 325 MG/1
975 TABLET ORAL ONCE
Status: COMPLETED | OUTPATIENT
Start: 2025-06-27 | End: 2025-06-27

## 2025-06-27 RX ORDER — CEFAZOLIN SODIUM 2 G/50ML
2 SOLUTION INTRAVENOUS ONCE
Status: COMPLETED | OUTPATIENT
Start: 2025-06-27 | End: 2025-06-27

## 2025-06-27 RX ORDER — SODIUM CHLORIDE 9 MG/ML
0-150 INJECTION, SOLUTION INTRAVENOUS AS NEEDED
Status: DISCONTINUED | OUTPATIENT
Start: 2025-06-27 | End: 2025-06-27 | Stop reason: HOSPADM

## 2025-06-27 RX ORDER — DOXYCYCLINE 100 MG/1
100 TABLET ORAL 2 TIMES DAILY
Qty: 20 TABLET | Refills: 0 | Status: SHIPPED | OUTPATIENT
Start: 2025-06-27 | End: 2025-07-07

## 2025-06-27 RX ORDER — CEPHALEXIN 500 MG/1
500 CAPSULE ORAL 3 TIMES DAILY
Qty: 30 CAPSULE | Refills: 0 | Status: SHIPPED | OUTPATIENT
Start: 2025-06-27 | End: 2025-07-07

## 2025-06-27 RX ADMIN — CEFAZOLIN SODIUM 2 G: 2 SOLUTION INTRAVENOUS at 17:49

## 2025-06-27 RX ADMIN — ACETAMINOPHEN 975 MG: 325 TABLET ORAL at 21:24

## 2025-06-27 RX ADMIN — SODIUM CHLORIDE 1000 ML: 0.9 INJECTION, SOLUTION INTRAVENOUS at 19:09

## 2025-06-27 RX ADMIN — VANCOMYCIN HYDROCHLORIDE 1000 MG: 1 INJECTION, SOLUTION INTRAVENOUS at 19:06

## 2025-06-27 ASSESSMENT — PAIN DESCRIPTION - LOCATION: LOCATION: LEG

## 2025-06-27 ASSESSMENT — PAIN SCALES - GENERAL
PAINLEVEL_OUTOF10: 3
PAINLEVEL_OUTOF10: 3

## 2025-06-27 ASSESSMENT — PAIN - FUNCTIONAL ASSESSMENT: PAIN_FUNCTIONAL_ASSESSMENT: 0-10

## 2025-06-27 NOTE — ED TRIAGE NOTES
Pt ambulates to triage with complaints of sores on her legs that have been there for months. Pt states that she is using antibiotic cream but there are not going away. Pt states that she is a diabetic. Pt presents with sores with redness on both legs with clear drainage.

## 2025-06-27 NOTE — ED TRIAGE NOTES
"As provider-in-triage, I performed a medical screening history and physical exam on this patient.  HISTORY OF PRESENT ILLNESS  Patient is a 79-year-old female presenting to the ED with concern for sores on her legs.  Patient states she has had bedbugs and has had bites on her legs for \"a while.\"  Starting yesterday the areas became red, inflamed, and tender.  She has applied triple antibiotic ointment to the areas the bites on her arms have improved the bites on her legs are getting worse.     PHYSICAL EXAM  Vital Signs reviewed.  Cardiovascular: Regular rate and rhythm. No m/g/r  Respiratory: No respiratory distress. No accessory muscle use. Breath sounds are present and equal b/l. No adventitious sounds.   Extremities: Erythematous wounds to lower extremities       MDM  Workup initiated. Pt stable pending bed availability and further evaluation. Please see subsequent provider note for further details and disposition.         I evaluated this patient in triage with the RN. Due to the patients complaint labs and or imaging were ordered by myself in an attempt to expedite patient care however I am not participating in care after evaluation. This is a preliminary assessment. Pt does not appear in acute distress at this time. They will have a full evaluation as soon as possible. They will be cared for by another provider who will possibly order more labs, imaging or interventions. Pt did not have a full ROS or PE completed by myself however above is a summary with reasons for orders.  For the remainder of the patient's workup and ED course, please refer to the main ED provider note. We discussed need for diagnostic testing including laboratory studies and imaging.  We also discussed that patient may be asked to wait in the waiting room while these tests are pending.  They understand that if they choose to leave without having the testing completed or resulted that we cannot rule out acute life threatening illnesses and " the risks involved could lead to worsening condition, permanent disability or even death.

## 2025-06-28 NOTE — ED PROVIDER NOTES
Emergency Department Provider Note       HPI: []  79-year-old white female history of hypertension, dyslipidemia, anxiety, prediabetes comes in with multiple lesions on her lower extremities bilaterally.  She states she has a multiple lesions lower extremities bilaterally due to a combination of insect bites and flea bites and mosquito bites.  No fever or chills.  No abdominal pain nausea diarrhea cough congestion incontinences seizures syncope or near syncope no hematemesis melena occasional hemoptysis.    Past day: Hypertension, dyslipidemia, prediabetes  Social: Denies current tobacco alcohol drug use.  REVIEW OF SYSTEMS:    GENERAL.: No weight loss, fatigue, anorexia, insomnia, fever.    EYES: No vision loss, double vision, drainage, eye pain.    ENT: No pharyngitis, dry mouth.    CARDIOPULMONARY: No chest pain, palpitations, syncope, near syncope. No shortness of breath, cough, hemoptysis.    GI: No abdominal pain, change in bowel habits, melena, hematemesis, hematochezia, nausea, vomiting, diarrhea.    : No discharge, dysuria, frequency, urgency, hematuria.    MS: No limb pain, joint pain, joint swelling.    SKIN: Positive for folliculitis lower extremities bilaterally  PSYCH: No depression, anxiety, suicidality, homicidality.    Review of systems is otherwise negative unless stated above or in history of present illness.  Social history, family history, allergies reviewed.  PHYSICAL EXAM:    GENERAL: Vitals noted, no distress. Alert and oriented  x 3. Non-toxic.      EENT: TMs clear. Posterior oropharynx unremarkable. No meningismus. No LAD.     NECK: Supple. Nontender. No midline tenderness.     CARDIAC: Regular, rate, rhythm. No murmurs rubs or gallops. No JVD    PULMONARY: Lungs clear bilaterally with good aeration. No wheezes rales or rhonchi. No respiratory distress.     ABDOMEN: Soft, nonsurgical. Nontender. No peritoneal signs. Normoactive bowel sounds. No pulsatile masses.     EXTREMITIES: No  peripheral edema. Negative Homans bilaterally, no cords.  2+ bounding pulses well-perfused.    SKIN: Patient has a bilateral areas of folliculitis lower extremities bilaterally with lesions with surrounding erythema redness with no obvious abscesses but had multiple lesions in both thighs and both calfs.    NEURO: No focal neurologic deficits, NIH score of 0. Cranial nerves normal as tested from II through XII.     MEDICAL DECISION MAKING:    CBC shows a leukocytosis of 14,000, CRP is about 5.3, chemistries LFTs are normal, lactate normal.    Treatment in the ED: IV established cultures onus send she was given intravenous vancomycin and cefazolin.    ED course: Repeat assessment feeling much better wants to go home.    Impression: #1 acute bilateral lower extremity folliculitis    Plan/MDM: 29-year-old female with a history of hypertension dyslipidemia prediabetes comes in with what appears to be bilateral lower extremity bacterial folliculitis currently low concern for systemic infection or bacteremia or sepsis normal lactate normal sensitive strongly advised hospitalization patient wants to go home will be discharged home with the Keflex and doxycycline urgent outpatient follow-up with primary care doctor with strict return precaution.                                                     Michelle Fajardo MD  06/27/25 2131       Michelle Fajardo MD  06/27/25 2131

## 2025-06-29 LAB
BACTERIA BLD CULT: NORMAL
BACTERIA BLD CULT: NORMAL

## 2025-07-02 LAB
BACTERIA BLD CULT: NORMAL
BACTERIA BLD CULT: NORMAL